# Patient Record
Sex: MALE | Race: WHITE | NOT HISPANIC OR LATINO | Employment: STUDENT | URBAN - METROPOLITAN AREA
[De-identification: names, ages, dates, MRNs, and addresses within clinical notes are randomized per-mention and may not be internally consistent; named-entity substitution may affect disease eponyms.]

---
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
## 2020-01-10 ENCOUNTER — OFFICE VISIT (OUTPATIENT)
Dept: GASTROENTEROLOGY | Facility: CLINIC | Age: 20
End: 2020-01-10
Payer: COMMERCIAL

## 2020-01-10 VITALS
TEMPERATURE: 97.4 F | HEIGHT: 74 IN | DIASTOLIC BLOOD PRESSURE: 60 MMHG | BODY MASS INDEX: 26.95 KG/M2 | WEIGHT: 210 LBS | SYSTOLIC BLOOD PRESSURE: 118 MMHG | HEART RATE: 65 BPM

## 2020-01-10 DIAGNOSIS — R14.0 BLOATING: Primary | ICD-10-CM

## 2020-01-10 PROCEDURE — 99214 OFFICE O/P EST MOD 30 MIN: CPT | Performed by: INTERNAL MEDICINE

## 2020-01-10 RX ORDER — SIMETHICONE 125 MG
125 TABLET,CHEWABLE ORAL EVERY 6 HOURS PRN
Qty: 30 TABLET | Refills: 0 | Status: SHIPPED | OUTPATIENT
Start: 2020-01-10 | End: 2020-03-19

## 2020-01-10 RX ORDER — SODIUM, POTASSIUM,MAG SULFATES 17.5-3.13G
180 SOLUTION, RECONSTITUTED, ORAL ORAL ONCE
Qty: 180 ML | Refills: 0 | Status: SHIPPED | OUTPATIENT
Start: 2020-01-10 | End: 2020-03-17 | Stop reason: ALTCHOICE

## 2020-01-10 NOTE — PROGRESS NOTES
[facility] - Outpatient Follow-up Note  Neetu Strickland 23 y o  male MRN: [id number]  Encounter: [id number]          ASSESSMENT AND PLAN:      1  Bloating  Will order celiac panel to rule out celiac disease as an etiology of his bloating/abdominal discomfort  - Celiac Disease Diagnostic Panel; Future  - simethicone (MYLICON) 902 MG chewable tablet; Chew 1 tablet (125 mg total) every 6 (six) hours as needed for flatulence  Dispense: 30 tablet; Refill: 0    2  Rectal bleeding  Still suspected to be hemorrhoidal in nature but given the ongoing nature likely needs endoscopic evaluation   - Colonoscopy; Future  - SUPREP BOWEL PREP KIT 17 5-3 13-1 6 GM/177ML SOLN; Take 180 mL by mouth once for 1 dose  Dispense: 180 mL; Refill: 0  ______________________________________________________________________    SUBJECTIVE:  Continues to have rectal bleeding  Did not resolve completely, still intermittent  Also now having abdominal bloating associated with meals  He is unsure if it really is related to gluten in the diet  REVIEW OF SYSTEMS IS OTHERWISE NEGATIVE        Historical Information   Past Medical History:   Diagnosis Date    Acute sinusitis 12/14/2015    Croup 12/26/2017    Rectal bleeding 4/22/2019     Past Surgical History:   Procedure Laterality Date    CIRCUMCISION      HERNIA REPAIR       Social History   Social History     Substance and Sexual Activity   Alcohol Use Never    Frequency: Never     Social History     Substance and Sexual Activity   Drug Use Never     Social History     Tobacco Use   Smoking Status Never Smoker   Smokeless Tobacco Never Used     Family History   Problem Relation Age of Onset    No Known Problems Mother     Other Father         GI Polyps       Meds/Allergies       Current Outpatient Medications:     albuterol (PROAIR HFA) 90 mcg/act inhaler    Multiple Vitamin (MULTIVITAMIN) tablet    Probiotic Product (PROBIOTIC PO)    calcium carbonate-vitamin D (OSCAL-D) 500 mg-200 units per tablet    fluticasone (FLOVENT HFA) 110 MCG/ACT inhaler    Omega-3 Fatty Acids (SM FISH OIL PO)    PAZEO 0 7 % SOLN    Allergies   Allergen Reactions    Amoxicillin Rash    Amoxicillin-Pot Clavulanate Rash           Objective     Blood pressure 118/60, pulse 65, temperature (!) 97 4 °F (36 3 °C), temperature source Tympanic, height 6' 2" (1 88 m), weight 95 3 kg (210 lb)  Body mass index is 26 96 kg/m²  PHYSICAL EXAM:      General Appearance:   Alert, cooperative, no distress   HEENT:   Normocephalic, atraumatic, anicteric  Lungs:   Clear to auscultation bilaterally; no rales, rhonchi or wheezing; respirations unlabored    Heart[de-identified]   Regular rate and rhythm; no murmur, rub, or gallop  Abdomen:   Soft, non-tender, non-distended; normal bowel sounds; no masses, no organomegaly    Genitalia:   Deferred    Rectal:   Deferred    Extremities:  No cyanosis, clubbing or edema    Pulses:  2+ and symmetric    Skin:  No jaundice, rashes, or lesions          Lab Results:   No visits with results within 1 Day(s) from this visit  Latest known visit with results is:   Orders Only on 09/22/2019   Component Date Value    Salmonella/Shigella Scre* 09/22/2019 Final report     Campylobacter Culture 09/22/2019 Final report     E Coli Shiga Toxin EIA 09/22/2019 Negative     Result 1 09/22/2019 Comment     Result 1 09/22/2019 Comment     Ova + Parasite Exam 09/22/2019 Final report     Result 1 09/22/2019 Comment          Radiology Results:   No results found

## 2020-01-10 NOTE — PATIENT INSTRUCTIONS
Colonoscopy scheduled with Dr Melgar at [facility] on 3/17/20  Kriss Fuentes gave patient verbal instructions/paper work given    [employer]

## 2020-01-11 LAB
ENDOMYSIUM IGA SER QL: NEGATIVE
IGA SERPL-MCNC: 121 MG/DL (ref 90–386)
TTG IGA SER-ACNC: <2 U/ML (ref 0–3)

## 2020-01-13 ENCOUNTER — TELEPHONE (OUTPATIENT)
Dept: GASTROENTEROLOGY | Facility: MEDICAL CENTER | Age: 20
End: 2020-01-13

## 2020-01-13 NOTE — TELEPHONE ENCOUNTER
----- Message from Sarita Holly PA-C sent at 1/13/2020  7:57 AM EST -----  Please let patient know blood work for celiac disease is negative, good news

## 2020-01-17 ENCOUNTER — TRANSCRIBE ORDERS (OUTPATIENT)
Dept: GASTROENTEROLOGY | Facility: CLINIC | Age: 20
End: 2020-01-17

## 2020-02-27 ENCOUNTER — TELEPHONE (OUTPATIENT)
Dept: GASTROENTEROLOGY | Facility: CLINIC | Age: 20
End: 2020-02-27

## 2020-02-27 NOTE — TELEPHONE ENCOUNTER
[address] [place] [address] and spoke to [address] [place] [address] for prior authorization for a colonoscopy  No auth required    Call ref # [id number]

## 2020-03-03 ENCOUNTER — ANESTHESIA EVENT (OUTPATIENT)
Dept: GASTROENTEROLOGY | Facility: AMBULARY SURGERY CENTER | Age: 20
End: 2020-03-03

## 2020-03-17 ENCOUNTER — HOSPITAL ENCOUNTER (OUTPATIENT)
Dept: GASTROENTEROLOGY | Facility: AMBULARY SURGERY CENTER | Age: 20
Setting detail: OUTPATIENT SURGERY
Discharge: HOME | End: 2020-03-17
Attending: INTERNAL MEDICINE | Admitting: INTERNAL MEDICINE
Payer: COMMERCIAL

## 2020-03-17 ENCOUNTER — ANESTHESIA (OUTPATIENT)
Dept: GASTROENTEROLOGY | Facility: AMBULARY SURGERY CENTER | Age: 20
End: 2020-03-17

## 2020-03-17 VITALS
WEIGHT: 196 LBS | DIASTOLIC BLOOD PRESSURE: 52 MMHG | HEIGHT: 74 IN | RESPIRATION RATE: 16 BRPM | BODY MASS INDEX: 25.15 KG/M2 | TEMPERATURE: 97.6 F | HEART RATE: 51 BPM | SYSTOLIC BLOOD PRESSURE: 98 MMHG | OXYGEN SATURATION: 96 %

## 2020-03-17 DIAGNOSIS — R14.0 BLOATING: ICD-10-CM

## 2020-03-17 PROCEDURE — 88305 TISSUE EXAM BY PATHOLOGIST: CPT | Performed by: PATHOLOGY

## 2020-03-17 PROCEDURE — 88313 SPECIAL STAINS GROUP 2: CPT | Performed by: PATHOLOGY

## 2020-03-17 PROCEDURE — 45380 COLONOSCOPY AND BIOPSY: CPT | Performed by: INTERNAL MEDICINE

## 2020-03-17 RX ORDER — PROPOFOL 10 MG/ML
INJECTION, EMULSION INTRAVENOUS AS NEEDED
Status: DISCONTINUED | OUTPATIENT
Start: 2020-03-17 | End: 2020-03-17 | Stop reason: SURG

## 2020-03-17 RX ORDER — SODIUM CHLORIDE, SODIUM LACTATE, POTASSIUM CHLORIDE, CALCIUM CHLORIDE 600; 310; 30; 20 MG/100ML; MG/100ML; MG/100ML; MG/100ML
125 INJECTION, SOLUTION INTRAVENOUS CONTINUOUS
Status: DISCONTINUED | OUTPATIENT
Start: 2020-03-17 | End: 2020-03-21 | Stop reason: HOSPADM

## 2020-03-17 RX ORDER — PROPOFOL 10 MG/ML
INJECTION, EMULSION INTRAVENOUS CONTINUOUS PRN
Status: DISCONTINUED | OUTPATIENT
Start: 2020-03-17 | End: 2020-03-17 | Stop reason: SURG

## 2020-03-17 RX ADMIN — PROPOFOL 100 MG: 10 INJECTION, EMULSION INTRAVENOUS at 10:27

## 2020-03-17 RX ADMIN — PROPOFOL 50 MG: 10 INJECTION, EMULSION INTRAVENOUS at 10:30

## 2020-03-17 RX ADMIN — SODIUM CHLORIDE, SODIUM LACTATE, POTASSIUM CHLORIDE, AND CALCIUM CHLORIDE: .6; .31; .03; .02 INJECTION, SOLUTION INTRAVENOUS at 10:23

## 2020-03-17 RX ADMIN — SODIUM CHLORIDE, SODIUM LACTATE, POTASSIUM CHLORIDE, AND CALCIUM CHLORIDE 125 ML/HR: .6; .31; .03; .02 INJECTION, SOLUTION INTRAVENOUS at 09:53

## 2020-03-17 RX ADMIN — PROPOFOL 140 MCG/KG/MIN: 10 INJECTION, EMULSION INTRAVENOUS at 10:30

## 2020-03-17 RX ADMIN — PROPOFOL 100 MG: 10 INJECTION, EMULSION INTRAVENOUS at 10:28

## 2020-03-17 NOTE — H&P
History and Physical -  [facility]  Neetu Ignacio Strickland 23 y o  male MRN: [id number]                  HPI: Arty Later is a 23y o  year old male who presents for colonoscopy      REVIEW OF SYSTEMS: Per the HPI, and otherwise unremarkable  Historical Information   Past Medical History:   Diagnosis Date    Acute sinusitis 12/14/2015    Asthma     Croup 12/26/2017    Rectal bleeding 4/22/2019     Past Surgical History:   Procedure Laterality Date    CIRCUMCISION      HERNIA REPAIR       Social History   Social History     Substance and Sexual Activity   Alcohol Use Never    Frequency: Never     Social History     Substance and Sexual Activity   Drug Use Never     Social History     Tobacco Use   Smoking Status Never Smoker   Smokeless Tobacco Never Used     Family History   Problem Relation Age of Onset    No Known Problems Mother     Other Father         GI Polyps       Meds/Allergies       (Not in a hospital admission)    Allergies   Allergen Reactions    Amoxicillin Rash    Amoxicillin-Pot Clavulanate Rash       Objective     /61   Pulse 57   Temp 98 6 °F (37 °C) (Temporal)   Resp 18   Ht 6' 2" (1 88 m)   Wt 88 9 kg (196 lb)   SpO2 100%   BMI 25 16 kg/m²       PHYSICAL EXAM    Gen: NAD  CV: RRR  CHEST: Clear  ABD: soft, NT/ND  EXT: no edema      ASSESSMENT/PLAN:  This is a 23y o  year old male here for colonscopy, and he is stable and optimized for his procedure

## 2020-03-17 NOTE — ANESTHESIA POSTPROCEDURE EVALUATION
Post-Op Assessment Note    CV Status:  Stable    Pain management: adequate     Mental Status:  Sleepy   Hydration Status:  Euvolemic   PONV Controlled:  Controlled   Airway Patency:  Patent   Post Op Vitals Reviewed: Yes      Staff: CRNA, Anesthesiologist           BP (!) 95/45 (03/17/20 1057)    Temp 97 6 °F (36 4 °C) (03/17/20 1057)    Pulse 67 (03/17/20 1057)   Resp 16 (03/17/20 1057)    SpO2 97 % (03/17/20 1057)

## 2020-03-17 NOTE — ANESTHESIA PREPROCEDURE EVALUATION
Review of Systems/Medical History          Cardiovascular   Pulmonary  Not a smoker , Asthma , well controlled/ stable Last rescue: < 6 months ago ,   Comment: Croup     GI/Hepatic            Endo/Other     GYN       Hematology   Musculoskeletal       Neurology   Psychology           Physical Exam    Airway    Mallampati score: I  TM Distance: >3 FB  Neck ROM: full     Dental   No notable dental hx     Cardiovascular      Pulmonary      Other Findings        Anesthesia Plan  ASA Score- 2     Anesthesia Type- IV sedation with anesthesia with ASA Monitors  Additional Monitors:   Airway Plan:         Plan Factors-Patient not instructed to abstain from smoking on day of procedure  Patient did not smoke on day of surgery  Induction- intravenous  Postoperative Plan-     Informed Consent- Anesthetic plan and risks discussed with patient  I personally reviewed this patient with the CRNA  Discussed and agreed on the Anesthesia Plan with the CRNA  Josefina Washington

## 2020-03-19 ENCOUNTER — OFFICE VISIT (OUTPATIENT)
Dept: PEDIATRICS CLINIC | Age: 20
End: 2020-03-19
Payer: COMMERCIAL

## 2020-03-19 VITALS
TEMPERATURE: 98.4 F | SYSTOLIC BLOOD PRESSURE: 114 MMHG | RESPIRATION RATE: 18 BRPM | HEART RATE: 78 BPM | DIASTOLIC BLOOD PRESSURE: 74 MMHG | BODY MASS INDEX: 26.64 KG/M2 | HEIGHT: 73 IN | WEIGHT: 201 LBS

## 2020-03-19 DIAGNOSIS — Z13.31 NEGATIVE DEPRESSION SCREENING: ICD-10-CM

## 2020-03-19 DIAGNOSIS — Z23 NEED FOR DIPHTHERIA-TETANUS-PERTUSSIS (TDAP) VACCINE: ICD-10-CM

## 2020-03-19 DIAGNOSIS — Z00.00 WELL ADULT EXAM: Primary | ICD-10-CM

## 2020-03-19 PROCEDURE — 99173 VISUAL ACUITY SCREEN: CPT | Performed by: PEDIATRICS

## 2020-03-19 PROCEDURE — 99395 PREV VISIT EST AGE 18-39: CPT | Performed by: PEDIATRICS

## 2020-03-19 PROCEDURE — 90715 TDAP VACCINE 7 YRS/> IM: CPT

## 2020-03-19 PROCEDURE — 90471 IMMUNIZATION ADMIN: CPT

## 2020-03-19 NOTE — PROGRESS NOTES
Subjective:     Maykel Gomez is a 23 y o  male who is brought in for this well child visit  History provided by: patient and mother    Current Issues:  Current concerns: none  Well Child Assessment:  Esequiel Slaughter lives with his mother and father  Interval problems include recent illness (small amount of blood in the stool found to have hemorroids) and recent injury (left ankle sprain March 6 2020 is doing better)  Nutrition  Types of intake include vegetables, fruits, meats, eggs, fish, cow's milk and junk food  Junk food includes fast food and desserts  Dental  The patient has a dental home  The patient brushes teeth regularly  The patient flosses regularly  Last dental exam was less than 6 months ago  Elimination  Elimination problems do not include constipation, diarrhea or urinary symptoms  There is no bed wetting  Sleep  Average sleep duration (hrs): 7-8  The patient does not snore  There are no sleep problems  Safety  There is no smoking in the home  Home has working smoke alarms? yes  Home has working carbon monoxide alarms? yes  There is a gun in home (locked away)  School  Grade level in school: College  There are no signs of learning disabilities  Child is doing well in school  Social  Screen time per day: Over 2 hours a day  The following portions of the patient's history were reviewed and updated as appropriate:   He  has a past medical history of Acute sinusitis (12/14/2015), Asthma, Croup (12/26/2017), and Rectal bleeding (4/22/2019)  He   There are no active problems to display for this patient  He  has a past surgical history that includes Circumcision and Hernia repair  His family history includes No Known Problems in his mother; Other in his father  He  reports that he has never smoked  He has never used smokeless tobacco  He reports that he does not drink alcohol or use drugs    Current Outpatient Medications   Medication Sig Dispense Refill    albuterol (PROAIR HFA) 90 mcg/act inhaler Inhale 1-2 puffs      fluticasone (FLOVENT HFA) 110 MCG/ACT inhaler Inhale 2 puffs 2 (two) times a day Rinse mouth after use   PAZEO 0 7 % SOLN        No current facility-administered medications for this visit  Facility-Administered Medications Ordered in Other Visits   Medication Dose Route Frequency Provider Last Rate Last Dose    lactated ringers infusion  125 mL/hr Intravenous Continuous Lenny Michael MD   Stopped at 03/17/20 1050     Current Outpatient Medications on File Prior to Visit   Medication Sig    albuterol (PROAIR HFA) 90 mcg/act inhaler Inhale 1-2 puffs    fluticasone (FLOVENT HFA) 110 MCG/ACT inhaler Inhale 2 puffs 2 (two) times a day Rinse mouth after use   PAZEO 0 7 % SOLN     [DISCONTINUED] calcium carbonate-vitamin D (OSCAL-D) 500 mg-200 units per tablet Take 1 tablet by mouth 2 (two) times a day with meals    [DISCONTINUED] Multiple Vitamin (MULTIVITAMIN) tablet Take 1 tablet by mouth daily    [DISCONTINUED] Omega-3 Fatty Acids (SM FISH OIL PO) Take by mouth    [DISCONTINUED] Probiotic Product (PROBIOTIC PO) Take by mouth    [DISCONTINUED] simethicone (MYLICON) 440 MG chewable tablet Chew 1 tablet (125 mg total) every 6 (six) hours as needed for flatulence (Patient not taking: Reported on 3/19/2020)     Current Facility-Administered Medications on File Prior to Visit   Medication    lactated ringers infusion     He is allergic to amoxicillin and amoxicillin-pot clavulanate         Review of Systems   Constitutional: Negative for fever  HENT: Negative for congestion and rhinorrhea  Eyes: Negative for discharge, redness and itching  Respiratory: Negative for snoring, cough and shortness of breath  Gastrointestinal: Negative for constipation, diarrhea and vomiting  Genitourinary: Negative for decreased urine volume and difficulty urinating  Skin: Negative for rash  Neurological: Negative for headaches     Psychiatric/Behavioral: Negative for sleep disturbance  Objective:       Vitals:    03/19/20 0957   BP: 114/74   BP Location: Left arm   Patient Position: Sitting   Cuff Size: Standard   Pulse: 78   Resp: 18   Temp: 98 4 °F (36 9 °C)   TempSrc: Temporal   Weight: 91 2 kg (201 lb)   Height: 6' 1" (1 854 m)     Growth parameters are noted and are appropriate for age  Wt Readings from Last 1 Encounters:   03/19/20 91 2 kg (201 lb) (93 %, Z= 1 47)*     * Growth percentiles are based on CDC (Boys, 2-20 Years) data  Ht Readings from Last 1 Encounters:   03/19/20 6' 1" (1 854 m) (89 %, Z= 1 22)*     * Growth percentiles are based on [facility] [place] (Boys, 2-20 Years) data  Body mass index is 26 52 kg/m²  Vitals:    03/19/20 0957   BP: 114/74   BP Location: Left arm   Patient Position: Sitting   Cuff Size: Standard   Pulse: 78   Resp: 18   Temp: 98 4 °F (36 9 °C)   TempSrc: Temporal   Weight: 91 2 kg (201 lb)   Height: 6' 1" (1 854 m)        Hearing Screening    Method: Otoacoustic emissions    125Hz 250Hz 500Hz 1000Hz 2000Hz 3000Hz 4000Hz 6000Hz 8000Hz   Right ear:     15 15 15     Left ear:     15 15 15     Comments: Bilateral pass  Right ear 5000 HZ - 15 DB   Left ear 5000 HZ - 15 DB      Visual Acuity Screening    Right eye Left eye Both eyes   Without correction:      With correction: 20/20 20/20 20/20   Comments: With contacts       Physical Exam   Constitutional: He is oriented to person, place, and time  He appears well-developed and well-nourished  No distress  HENT:   Head: Normocephalic and atraumatic  Right Ear: Tympanic membrane and external ear normal    Left Ear: Tympanic membrane and external ear normal    Nose: Nose normal    Mouth/Throat: Oropharynx is clear and moist  No oropharyngeal exudate  Eyes: Pupils are equal, round, and reactive to light  Conjunctivae and EOM are normal  Right eye exhibits no discharge  Left eye exhibits no discharge  Fundi clear   Neck: Normal range of motion  Neck supple  No thyromegaly present  Cardiovascular: Normal rate, regular rhythm and normal heart sounds  No murmur heard  Pulmonary/Chest: Effort normal and breath sounds normal  No respiratory distress  He has no wheezes  He has no rales  Abdominal: Soft  Bowel sounds are normal  He exhibits no distension and no mass  There is no tenderness  There is no guarding  Genitourinary:   Genitourinary Comments: Rodirgo 5   Musculoskeletal: Normal range of motion  He exhibits tenderness (left lateral malleous )  No scoliosis   Lymphadenopathy:     He has no cervical adenopathy  Neurological: He is alert and oriented to person, place, and time  He has normal reflexes  He displays normal reflexes  No cranial nerve deficit  He exhibits normal muscle tone  Skin: Skin is dry  Psychiatric: He has a normal mood and affect  His behavior is normal  Judgment and thought content normal    Nursing note and vitals reviewed  Assessment:     Well adolescent  1  Well adult exam     2  Need for diphtheria-tetanus-pertussis (Tdap) vaccine  TDAP VACCINE GREATER THAN OR EQUAL TO 8YO IM   3  BMI 25 0-25 9,adult     4  Negative depression screening          Plan:         1  Anticipatory guidance discussed  Specific topics reviewed: drugs, ETOH, and tobacco, limit TV, media violence, seat belts, sex; STD and pregnancy prevention and testicular self-exam     BMI Counseling: Body mass index is 26 52 kg/m²  The BMI is above normal  Nutrition recommendations include consuming healthier snacks, moderation in carbohydrate intake and reducing intake of saturated and trans fat  Exercise recommendations include exercising 3-5 times per week  No pharmacotherapy was ordered  2  Development: appropriate for age    1  Immunizations today: per orders  Vaccine Counseling: Discussed with: Ped parent/guardian: mother and patient    The benefits, contraindication and side effects for the following vaccines were reviewed: Immunization component list: Tetanus, Diphtheria and pertussis  Total number of components reveiwed:3    4  Follow-up visit in 1 year for next well child visit, or sooner as needed

## 2020-04-02 ENCOUNTER — TELEPHONE (OUTPATIENT)
Dept: GASTROENTEROLOGY | Facility: CLINIC | Age: 20
End: 2020-04-02

## 2021-01-25 ENCOUNTER — TELEPHONE (OUTPATIENT)
Dept: PEDIATRICS CLINIC | Age: 21
End: 2021-01-25

## 2021-01-25 DIAGNOSIS — Z20.822 CLOSE EXPOSURE TO COVID-19 VIRUS: Primary | ICD-10-CM

## 2021-01-25 DIAGNOSIS — Z20.822 CLOSE EXPOSURE TO COVID-19 VIRUS: ICD-10-CM

## 2021-01-25 PROCEDURE — U0005 INFEC AGEN DETEC AMPLI PROBE: HCPCS | Performed by: PEDIATRICS

## 2021-01-25 PROCEDURE — U0003 INFECTIOUS AGENT DETECTION BY NUCLEIC ACID (DNA OR RNA); SEVERE ACUTE RESPIRATORY SYNDROME CORONAVIRUS 2 (SARS-COV-2) (CORONAVIRUS DISEASE [COVID-19]), AMPLIFIED PROBE TECHNIQUE, MAKING USE OF HIGH THROUGHPUT TECHNOLOGIES AS DESCRIBED BY CMS-2020-01-R: HCPCS | Performed by: PEDIATRICS

## 2021-01-27 LAB — SARS-COV-2 RNA RESP QL NAA+PROBE: NEGATIVE

## 2021-03-10 DIAGNOSIS — Z23 ENCOUNTER FOR IMMUNIZATION: ICD-10-CM

## 2021-06-01 ENCOUNTER — OFFICE VISIT (OUTPATIENT)
Dept: PEDIATRICS CLINIC | Age: 21
End: 2021-06-01
Payer: COMMERCIAL

## 2021-06-01 VITALS
HEART RATE: 72 BPM | WEIGHT: 199 LBS | RESPIRATION RATE: 12 BRPM | TEMPERATURE: 98.6 F | SYSTOLIC BLOOD PRESSURE: 126 MMHG | DIASTOLIC BLOOD PRESSURE: 70 MMHG | BODY MASS INDEX: 26.37 KG/M2 | HEIGHT: 73 IN

## 2021-06-01 DIAGNOSIS — F41.9 ANXIETY: ICD-10-CM

## 2021-06-01 DIAGNOSIS — Z00.00 WELL ADULT EXAM: Primary | ICD-10-CM

## 2021-06-01 DIAGNOSIS — Z13.31 NEGATIVE DEPRESSION SCREENING: ICD-10-CM

## 2021-06-01 PROCEDURE — 99173 VISUAL ACUITY SCREEN: CPT | Performed by: PEDIATRICS

## 2021-06-01 PROCEDURE — 99395 PREV VISIT EST AGE 18-39: CPT | Performed by: PEDIATRICS

## 2021-06-01 NOTE — PROGRESS NOTES
Subjective:     Chantale Peñaloza is a 21 y o  male who is brought in for this well child visit  History provided by: patient    Current Issues:  Current concerns: Anxiety in social situations  Would like to talk to a therapist      Well Child Assessment:  Giselle Stone lives with his mother and father  Interval problems do not include recent illness or recent injury  Nutrition  Types of intake include vegetables, fruits, meats, eggs, cow's milk, fish and junk food  Junk food includes desserts (limited)  Dental  The patient has a dental home  The patient brushes teeth regularly  The patient flosses regularly  Last dental exam was less than 6 months ago  Elimination  Elimination problems do not include constipation, diarrhea or urinary symptoms  There is no bed wetting  Behavioral  Behavioral issues do not include performing poorly at school  Sleep  Average sleep duration (hrs): 7-8  The patient does not snore  There are no sleep problems  Safety  There is no smoking in the home  Home has working smoke alarms? yes  Home has working carbon monoxide alarms? yes  There is a gun in home (locked away)  School  Grade level in school: College  There are signs of learning disabilities  Child is doing well in school  Social  The caregiver enjoys the child  Screen time per day: Over 2 hours a day  The following portions of the patient's history were reviewed and updated as appropriate:   He  has a past medical history of Acute sinusitis (12/14/2015), Asthma, Croup (12/26/2017), and Rectal bleeding (4/22/2019)  He   Patient Active Problem List    Diagnosis Date Noted    Well adult exam 06/01/2021    Anxiety 06/01/2021    Negative depression screening 06/01/2021    BMI 26 0-26 9,adult 06/01/2021     He  has a past surgical history that includes Circumcision and Hernia repair  His family history includes No Known Problems in his mother; Other in his father  He  reports that he has never smoked   He has never used smokeless tobacco  He reports that he does not drink alcohol or use drugs  Current Outpatient Medications   Medication Sig Dispense Refill    albuterol (PROAIR HFA) 90 mcg/act inhaler Inhale 1-2 puffs      fluticasone (FLOVENT HFA) 110 MCG/ACT inhaler Inhale 2 puffs 2 (two) times a day Rinse mouth after use   PAZEO 0 7 % SOLN        No current facility-administered medications for this visit  Current Outpatient Medications on File Prior to Visit   Medication Sig    albuterol (PROAIR HFA) 90 mcg/act inhaler Inhale 1-2 puffs    fluticasone (FLOVENT HFA) 110 MCG/ACT inhaler Inhale 2 puffs 2 (two) times a day Rinse mouth after use   PAZEO 0 7 % SOLN      No current facility-administered medications on file prior to visit  He is allergic to amoxicillin and amoxicillin-pot clavulanate         Review of Systems   Constitutional: Negative for fever  HENT: Negative for congestion and rhinorrhea  Eyes: Negative for discharge, redness and itching  Respiratory: Negative for snoring, cough and shortness of breath  Gastrointestinal: Negative for constipation, diarrhea and vomiting  Genitourinary: Negative for decreased urine volume and difficulty urinating  Skin: Negative for rash  Neurological: Negative for headaches  Psychiatric/Behavioral: Negative for sleep disturbance  The patient is nervous/anxious  Objective:       Vitals:    06/01/21 1259   BP: 126/70   Pulse: 72   Resp: 12   Temp: 98 6 °F (37 °C)   Weight: 90 3 kg (199 lb)   Height: 6' 1" (1 854 m)     Growth parameters are noted and are appropriate for age  Wt Readings from Last 1 Encounters:   06/01/21 90 3 kg (199 lb)     Ht Readings from Last 1 Encounters:   06/01/21 6' 1" (1 854 m)      Body mass index is 26 25 kg/m²      Vitals:    06/01/21 1259   BP: 126/70   Pulse: 72   Resp: 12   Temp: 98 6 °F (37 °C)   Weight: 90 3 kg (199 lb)   Height: 6' 1" (1 854 m)        Hearing Screening    Method: Otoacoustic emissions    125Hz 250Hz 500Hz 1000Hz 2000Hz 3000Hz 4000Hz 6000Hz 8000Hz   Right ear:     15 15 15     Left ear:     15 15 15     Comments: Pass bilat  R 5000hz 15db  L 5000hz 15db     Visual Acuity Screening    Right eye Left eye Both eyes   Without correction:      With correction: 20/20 20/20 20/20   Comments: contacts      Physical Exam  Vitals signs and nursing note reviewed  Constitutional:       General: He is not in acute distress  Appearance: Normal appearance  He is well-developed  He is not ill-appearing or toxic-appearing  HENT:      Head: Normocephalic and atraumatic  Right Ear: Tympanic membrane and external ear normal       Left Ear: Tympanic membrane and external ear normal       Nose: Nose normal  No congestion or rhinorrhea  Mouth/Throat:      Mouth: Mucous membranes are moist       Pharynx: Oropharynx is clear  No oropharyngeal exudate or posterior oropharyngeal erythema  Eyes:      General:         Right eye: No discharge  Left eye: No discharge  Extraocular Movements: Extraocular movements intact  Conjunctiva/sclera: Conjunctivae normal       Pupils: Pupils are equal, round, and reactive to light  Comments: Fundi clear   Neck:      Musculoskeletal: Normal range of motion and neck supple  Thyroid: No thyromegaly  Cardiovascular:      Rate and Rhythm: Normal rate and regular rhythm  Pulses: Normal pulses  Heart sounds: Normal heart sounds  No murmur  Pulmonary:      Effort: Pulmonary effort is normal  No respiratory distress  Breath sounds: Normal breath sounds  No wheezing or rales  Abdominal:      General: Bowel sounds are normal  There is no distension  Palpations: Abdomen is soft  There is no mass  Tenderness: There is no abdominal tenderness  There is no right CVA tenderness, left CVA tenderness or guarding  Genitourinary:     Penis: Normal        Scrotum/Testes: Normal       Comments:  Rodrigo 5  Musculoskeletal: Normal range of motion  Comments: No vertebral asymmetry   Lymphadenopathy:      Cervical: No cervical adenopathy  Skin:     General: Skin is dry  Neurological:      Mental Status: He is alert and oriented to person, place, and time  Cranial Nerves: No cranial nerve deficit  Motor: No abnormal muscle tone  Deep Tendon Reflexes: Reflexes are normal and symmetric  Reflexes normal    Psychiatric:         Mood and Affect: Mood normal          Behavior: Behavior normal          Thought Content: Thought content normal          Judgment: Judgment normal            Assessment:     Well adolescent  1  Well adult exam  CBC and differential    Lipid panel    Comprehensive metabolic panel    CBC and differential    Lipid panel    Comprehensive metabolic panel   2  Anxiety  Ambulatory referral to Psychology   3  Negative depression screening     4  BMI 26 0-26 9,adult          Plan:         1  Anticipatory guidance discussed  Specific topics reviewed: bicycle helmets, drugs, ETOH, and tobacco, importance of regular exercise, importance of varied diet, limit TV, media violence, minimize junk food, seat belts, sex; STD and pregnancy prevention and testicular self-exam     BMI Counseling: Body mass index is 26 25 kg/m²  The BMI is above normal  Nutrition recommendations include encouraging healthy choices of fruits and vegetables, moderation in carbohydrate intake and increasing intake of lean protein  Exercise recommendations include moderate physical activity 150 minutes/week  No pharmacotherapy was ordered  2  Development: appropriate for age    1  Immunizations today: none      4  Follow-up visit in 1 year for next well child visit, or sooner as needed

## 2021-06-02 ENCOUNTER — TELEPHONE (OUTPATIENT)
Dept: PSYCHIATRY | Facility: CLINIC | Age: 21
End: 2021-06-02

## 2021-06-02 NOTE — TELEPHONE ENCOUNTER
Patient called and would like to set up an apt he has a referral on file can you please give him a call thank you

## 2021-06-04 ENCOUNTER — TELEPHONE (OUTPATIENT)
Dept: PSYCHIATRY | Facility: CLINIC | Age: 21
End: 2021-06-04

## 2021-06-04 NOTE — TELEPHONE ENCOUNTER
Patient is interested in setting up an apt for therapy can you please add him to the wait list thank you

## 2021-06-21 LAB
ALBUMIN SERPL-MCNC: 4.6 G/DL (ref 4.1–5.2)
ALBUMIN/GLOB SERPL: 2.2 {RATIO} (ref 1.2–2.2)
ALP SERPL-CCNC: 72 IU/L (ref 55–125)
ALT SERPL-CCNC: 19 IU/L (ref 0–44)
AST SERPL-CCNC: 20 IU/L (ref 0–40)
BASOPHILS # BLD AUTO: 0.1 X10E3/UL (ref 0–0.2)
BASOPHILS NFR BLD AUTO: 1 %
BILIRUB SERPL-MCNC: 0.7 MG/DL (ref 0–1.2)
BUN SERPL-MCNC: 23 MG/DL (ref 6–20)
BUN/CREAT SERPL: 20 (ref 9–20)
CALCIUM SERPL-MCNC: 9.9 MG/DL (ref 8.7–10.2)
CHLORIDE SERPL-SCNC: 101 MMOL/L (ref 96–106)
CHOLEST SERPL-MCNC: 137 MG/DL (ref 100–199)
CHOLEST/HDLC SERPL: 2.4 RATIO (ref 0–5)
CO2 SERPL-SCNC: 27 MMOL/L (ref 20–29)
CREAT SERPL-MCNC: 1.17 MG/DL (ref 0.76–1.27)
EOSINOPHIL # BLD AUTO: 0.1 X10E3/UL (ref 0–0.4)
EOSINOPHIL NFR BLD AUTO: 3 %
ERYTHROCYTE [DISTWIDTH] IN BLOOD BY AUTOMATED COUNT: 11.9 % (ref 11.6–15.4)
GLOBULIN SER-MCNC: 2.1 G/DL (ref 1.5–4.5)
GLUCOSE SERPL-MCNC: 87 MG/DL (ref 65–99)
HCT VFR BLD AUTO: 40.7 % (ref 37.5–51)
HDLC SERPL-MCNC: 57 MG/DL
HGB BLD-MCNC: 13.5 G/DL (ref 13–17.7)
IMM GRANULOCYTES # BLD: 0 X10E3/UL (ref 0–0.1)
IMM GRANULOCYTES NFR BLD: 0 %
LDLC SERPL CALC-MCNC: 69 MG/DL (ref 0–99)
LYMPHOCYTES # BLD AUTO: 1.8 X10E3/UL (ref 0.7–3.1)
LYMPHOCYTES NFR BLD AUTO: 38 %
MCH RBC QN AUTO: 30.3 PG (ref 26.6–33)
MCHC RBC AUTO-ENTMCNC: 33.2 G/DL (ref 31.5–35.7)
MCV RBC AUTO: 92 FL (ref 79–97)
MONOCYTES # BLD AUTO: 0.5 X10E3/UL (ref 0.1–0.9)
MONOCYTES NFR BLD AUTO: 10 %
NEUTROPHILS # BLD AUTO: 2.3 X10E3/UL (ref 1.4–7)
NEUTROPHILS NFR BLD AUTO: 48 %
PLATELET # BLD AUTO: 179 X10E3/UL (ref 150–450)
POTASSIUM SERPL-SCNC: 4.5 MMOL/L (ref 3.5–5.2)
PROT SERPL-MCNC: 6.7 G/DL (ref 6–8.5)
RBC # BLD AUTO: 4.45 X10E6/UL (ref 4.14–5.8)
SL AMB EGFR AFRICAN AMERICAN: 103 ML/MIN/1.73
SL AMB EGFR NON AFRICAN AMERICAN: 89 ML/MIN/1.73
SL AMB VLDL CHOLESTEROL CALC: 11 MG/DL (ref 5–40)
SODIUM SERPL-SCNC: 139 MMOL/L (ref 134–144)
TRIGL SERPL-MCNC: 48 MG/DL (ref 0–149)
WBC # BLD AUTO: 4.7 X10E3/UL (ref 3.4–10.8)

## 2022-01-03 ENCOUNTER — TELEPHONE (OUTPATIENT)
Dept: PEDIATRICS CLINIC | Age: 22
End: 2022-01-03

## 2022-01-04 ENCOUNTER — OFFICE VISIT (OUTPATIENT)
Dept: PEDIATRICS CLINIC | Age: 22
End: 2022-01-04
Payer: COMMERCIAL

## 2022-01-04 ENCOUNTER — HOSPITAL ENCOUNTER (OUTPATIENT)
Dept: RADIOLOGY | Facility: HOSPITAL | Age: 22
Discharge: HOME | End: 2022-01-04
Attending: PEDIATRICS
Payer: COMMERCIAL

## 2022-01-04 ENCOUNTER — OFFICE VISIT (OUTPATIENT)
Dept: LAB | Facility: HOSPITAL | Age: 22
End: 2022-01-04
Attending: PEDIATRICS
Payer: COMMERCIAL

## 2022-01-04 VITALS
SYSTOLIC BLOOD PRESSURE: 118 MMHG | DIASTOLIC BLOOD PRESSURE: 70 MMHG | BODY MASS INDEX: 26.65 KG/M2 | TEMPERATURE: 98.2 F | WEIGHT: 202 LBS

## 2022-01-04 DIAGNOSIS — R07.9 CHEST PAIN, UNSPECIFIED TYPE: ICD-10-CM

## 2022-01-04 DIAGNOSIS — R07.9 CHEST PAIN, UNSPECIFIED TYPE: Primary | ICD-10-CM

## 2022-01-04 LAB
ATRIAL RATE: 62 BPM
P AXIS: 67 DEGREES
PR INTERVAL: 160 MS
QRS AXIS: 21 DEGREES
QRSD INTERVAL: 102 MS
QT INTERVAL: 402 MS
QTC INTERVAL: 408 MS
T WAVE AXIS: 34 DEGREES
VENTRICULAR RATE: 62 BPM

## 2022-01-04 PROCEDURE — 99213 OFFICE O/P EST LOW 20 MIN: CPT | Performed by: PEDIATRICS

## 2022-01-04 PROCEDURE — 93005 ELECTROCARDIOGRAM TRACING: CPT

## 2022-01-04 PROCEDURE — 93010 ELECTROCARDIOGRAM REPORT: CPT | Performed by: INTERNAL MEDICINE

## 2022-01-04 PROCEDURE — 71046 X-RAY EXAM CHEST 2 VIEWS: CPT

## 2022-01-04 NOTE — PROGRESS NOTES
Assessment/Plan:  He is feeling much better this week  He did the same work out yesterday and had no chest pain  I will get a chest xray and ECG  He will follow up pending the results  Increase hydration with the workouts  Diagnoses and all orders for this visit:    Chest pain, unspecified type  -     ECG 12 lead; Future  -     XR chest pa & lateral; Future          Subjective:      Patient ID: Rj Larsen is a 24 y o  male  Chest Pain   This is a new problem  The current episode started 1 to 4 weeks ago  The onset quality is gradual  The problem occurs intermittently  The pain is present in the substernal region  The quality of the pain is described as heavy  The pain does not radiate  Associated symptoms include dizziness, exertional chest pressure, headaches, malaise/fatigue, near-syncope, palpitations and shortness of breath  Pertinent negatives include no abdominal pain, back pain, cough, diaphoresis, fever, leg pain, lower extremity edema, nausea, numbness, syncope, vomiting or weakness  The pain is aggravated by exertion  He has tried rest for the symptoms  The treatment provided significant relief  Risk factors include male gender  Pertinent negatives for family medical history include: no heart disease and no early MI  The following portions of the patient's history were reviewed and updated as appropriate:   He  has a past medical history of Acute sinusitis (12/14/2015), Asthma, Croup (12/26/2017), and Rectal bleeding (4/22/2019)  He   Patient Active Problem List    Diagnosis Date Noted    Well adult exam 06/01/2021    Anxiety 06/01/2021    Negative depression screening 06/01/2021    BMI 26 0-26 9,adult 06/01/2021     He  has a past surgical history that includes Circumcision and Hernia repair  His family history includes No Known Problems in his mother; Other in his father  He  reports that he has never smoked   He has never used smokeless tobacco  He reports that he does not drink alcohol and does not use drugs  Current Outpatient Medications   Medication Sig Dispense Refill    albuterol (PROAIR HFA) 90 mcg/act inhaler Inhale 1-2 puffs      fluticasone (FLOVENT HFA) 110 MCG/ACT inhaler Inhale 2 puffs 2 (two) times a day Rinse mouth after use   PAZEO 0 7 % SOLN        No current facility-administered medications for this visit  Current Outpatient Medications on File Prior to Visit   Medication Sig    albuterol (PROAIR HFA) 90 mcg/act inhaler Inhale 1-2 puffs    fluticasone (FLOVENT HFA) 110 MCG/ACT inhaler Inhale 2 puffs 2 (two) times a day Rinse mouth after use   PAZEO 0 7 % SOLN      No current facility-administered medications on file prior to visit  He is allergic to amoxicillin and amoxicillin-pot clavulanate       Review of Systems   Constitutional: Positive for malaise/fatigue  Negative for diaphoresis and fever  HENT: Negative for congestion and rhinorrhea  Respiratory: Positive for shortness of breath  Negative for cough and chest tightness  Cardiovascular: Positive for chest pain, palpitations and near-syncope  Negative for syncope  Gastrointestinal: Negative for abdominal pain, nausea and vomiting  Genitourinary: Negative for decreased urine volume  Musculoskeletal: Negative for back pain  Neurological: Positive for dizziness and headaches  Negative for weakness, light-headedness and numbness  Objective:      /70   Temp 98 2 °F (36 8 °C)   Wt 91 6 kg (202 lb)   BMI 26 65 kg/m²          Physical Exam  Constitutional:       General: He is not in acute distress  Appearance: He is well-developed and normal weight  He is not ill-appearing, toxic-appearing or diaphoretic  HENT:      Head: Normocephalic  Right Ear: External ear normal       Left Ear: External ear normal       Nose: Nose normal       Mouth/Throat:      Pharynx: No oropharyngeal exudate  Eyes:      General:         Right eye: No discharge  Left eye: No discharge  Conjunctiva/sclera: Conjunctivae normal       Pupils: Pupils are equal, round, and reactive to light  Neck:      Vascular: No JVD  Cardiovascular:      Rate and Rhythm: Normal rate and regular rhythm  Pulses:           Carotid pulses are 2+ on the right side and 2+ on the left side  Radial pulses are 2+ on the right side and 2+ on the left side  Heart sounds: Normal heart sounds  No murmur heard  Pulmonary:      Effort: Pulmonary effort is normal  No respiratory distress  Breath sounds: Normal breath sounds  No wheezing or rales  Chest:      Chest wall: No mass, deformity or tenderness  Abdominal:      General: Bowel sounds are normal  There is no distension  Palpations: Abdomen is soft  There is no mass  Tenderness: There is no abdominal tenderness  There is no guarding  Musculoskeletal:      Cervical back: Normal range of motion and neck supple  Right lower leg: No edema  Left lower leg: No edema  Lymphadenopathy:      Cervical: No cervical adenopathy  Skin:     General: Skin is warm  Neurological:      Mental Status: He is alert  Psychiatric:         Mood and Affect: Mood is not anxious  Behavior: Behavior normal  Behavior is not agitated

## 2022-01-06 NOTE — RESULT ENCOUNTER NOTE
Chest x-ray was normal   No treatment at this time    If the pain in the chest returns then [facility] would need to see a cardiologist

## 2022-05-18 ENCOUNTER — OFFICE VISIT (OUTPATIENT)
Dept: PEDIATRICS CLINIC | Age: 22
End: 2022-05-18
Payer: COMMERCIAL

## 2022-05-18 VITALS
DIASTOLIC BLOOD PRESSURE: 72 MMHG | SYSTOLIC BLOOD PRESSURE: 118 MMHG | HEART RATE: 68 BPM | BODY MASS INDEX: 25.05 KG/M2 | RESPIRATION RATE: 16 BRPM | WEIGHT: 189 LBS | HEIGHT: 73 IN | TEMPERATURE: 97.8 F

## 2022-05-18 DIAGNOSIS — Z71.82 EXERCISE COUNSELING: ICD-10-CM

## 2022-05-18 DIAGNOSIS — R19.7 BLOODY DIARRHEA: ICD-10-CM

## 2022-05-18 DIAGNOSIS — R07.9 CHEST PAIN, UNSPECIFIED TYPE: ICD-10-CM

## 2022-05-18 DIAGNOSIS — Z71.3 DIETARY COUNSELING: ICD-10-CM

## 2022-05-18 DIAGNOSIS — Z00.00 WELL ADULT EXAM: Primary | ICD-10-CM

## 2022-05-18 DIAGNOSIS — Z13.31 NEGATIVE DEPRESSION SCREENING: ICD-10-CM

## 2022-05-18 PROCEDURE — 99173 VISUAL ACUITY SCREEN: CPT | Performed by: PEDIATRICS

## 2022-05-18 PROCEDURE — 99395 PREV VISIT EST AGE 18-39: CPT | Performed by: PEDIATRICS

## 2022-05-18 NOTE — PROGRESS NOTES
Subjective:     Mark Liu is a 24 y o  male who is brought in for this well child visit  History provided by: patient    Current Issues:  Current concerns: blood and mucus in the stool x 2 weeks  Just returned from [facility] for a 2 month stay  In the past he felt lumps on the base of each testicle  No lumps now  Slight discomfort but no other symptoms  No sexually active  If the masses return I would get an ultrasound of the testicles  Well Child Assessment:  Adrian Conte lives with his mother and father  Interval problems include recent illness (Gastroenteritis last week and still has some diarrhea  )  Nutrition  Types of intake include vegetables, fruits, meats, eggs, fish, cow's milk and junk food  Junk food includes desserts (limited)  Dental  The patient has a dental home  The patient brushes teeth regularly  The patient flosses regularly  Last dental exam was less than 6 months ago  Elimination  Elimination problems include diarrhea (blood and mucus in the stool)  Elimination problems do not include constipation or urinary symptoms  Sleep  Average sleep duration (hrs): 7  There are no sleep problems  Safety  There is no smoking in the home  Home has working smoke alarms? yes  Home has working carbon monoxide alarms? yes  There is a gun in home (Locked away)  School  Grade level in school: College  There are no signs of learning disabilities  Child is doing well in school  Social  Screen time per day: Over 2 hours  The following portions of the patient's history were reviewed and updated as appropriate:   He  has a past medical history of Acute sinusitis (12/14/2015), Asthma, Croup (12/26/2017), and Rectal bleeding (4/22/2019)    He   Patient Active Problem List    Diagnosis Date Noted    Bloody diarrhea 05/18/2022    Dietary counseling 05/18/2022    Exercise counseling 05/18/2022    BMI 24 0-24 9, adult 05/18/2022    Chest pain 01/04/2022    Well adult exam 06/01/2021    Anxiety 06/01/2021    Negative depression screening 06/01/2021    BMI 26 0-26 9,adult 06/01/2021     He  has a past surgical history that includes Circumcision and Hernia repair  His family history includes No Known Problems in his mother; Other in his father  He  reports that he has never smoked  He has never used smokeless tobacco  He reports current alcohol use  He reports that he does not use drugs  Current Outpatient Medications   Medication Sig Dispense Refill    albuterol (PROAIR HFA) 90 mcg/act inhaler Inhale 1-2 puffs      fluticasone (FLOVENT HFA) 110 MCG/ACT inhaler Inhale 2 puffs 2 (two) times a day Rinse mouth after use   PAZEO 0 7 % SOLN        No current facility-administered medications for this visit  Current Outpatient Medications on File Prior to Visit   Medication Sig    albuterol (PROAIR HFA) 90 mcg/act inhaler Inhale 1-2 puffs    fluticasone (FLOVENT HFA) 110 MCG/ACT inhaler Inhale 2 puffs 2 (two) times a day Rinse mouth after use   PAZEO 0 7 % SOLN      No current facility-administered medications on file prior to visit  He is allergic to amoxicillin and amoxicillin-pot clavulanate         Review of Systems   Constitutional: Positive for appetite change  Negative for fever  HENT: Negative for congestion and rhinorrhea  Eyes: Negative for discharge, redness and itching  Respiratory: Negative for cough and shortness of breath  Cardiovascular: Positive for chest pain (history of intermittent pain)  Negative for palpitations  Gastrointestinal: Positive for abdominal pain, blood in stool and diarrhea (blood and mucus in the stool)  Negative for constipation and vomiting  Anal bleeding: mild  Genitourinary: Negative for decreased urine volume and difficulty urinating  Skin: Negative for rash  Neurological: Negative for headaches  Psychiatric/Behavioral: Negative for sleep disturbance          Objective:    PHQ-2/9 Depression Screening    Little interest or pleasure in doing things: 0 - not at all  Feeling down, depressed, or hopeless: 0 - not at all  Trouble falling or staying asleep, or sleeping too much: 0 - not at all  Feeling tired or having little energy: 0 - not at all  Poor appetite or overeatin - not at all  Feeling bad about yourself - or that you are a failure or have let yourself or your family down: 0 - not at all  Trouble concentrating on things, such as reading the newspaper or watching television: 0 - not at all  Moving or speaking so slowly that other people could have noticed  Or the opposite - being so fidgety or restless that you have been moving around a lot more than usual: 0 - not at all  Thoughts that you would be better off dead, or of hurting yourself in some way: 0 - not at all  PHQ-2 Score: 0  PHQ-2 Interpretation: Negative depression screen  PHQ-9 Score: 0   PHQ-9 Interpretation: No or Minimal depression             Vitals:    22 1013   BP: 118/72   Pulse: 68   Resp: 16   Temp: 97 8 °F (36 6 °C)   Weight: 85 7 kg (189 lb)   Height: 6' 1 25" (1 861 m)     Growth parameters are noted and are appropriate for age  Wt Readings from Last 1 Encounters:   22 85 7 kg (189 lb)     Ht Readings from Last 1 Encounters:   22 6' 1 25" (1 861 m)      Body mass index is 24 77 kg/m²  Vitals:    22 1013   BP: 118/72   Pulse: 68   Resp: 16   Temp: 97 8 °F (36 6 °C)   Weight: 85 7 kg (189 lb)   Height: 6' 1 25" (1 861 m)        Visual Acuity Screening    Right eye Left eye Both eyes   Without correction:      With correction: 20/25 20/30 20/20   Comments: Contacts     Hearing Screening Comments: broken    Physical Exam  Vitals and nursing note reviewed  Constitutional:       General: He is not in acute distress  Appearance: Normal appearance  He is well-developed  He is not ill-appearing  HENT:      Head: Normocephalic and atraumatic        Right Ear: Tympanic membrane and external ear normal       Left Ear: Tympanic membrane and external ear normal       Nose: Nose normal       Mouth/Throat:      Mouth: Mucous membranes are moist       Pharynx: Oropharynx is clear  No oropharyngeal exudate  Eyes:      General:         Right eye: No discharge  Left eye: No discharge  Extraocular Movements: Extraocular movements intact  Conjunctiva/sclera: Conjunctivae normal       Pupils: Pupils are equal, round, and reactive to light  Comments: Fundi clear   Neck:      Thyroid: No thyromegaly  Cardiovascular:      Rate and Rhythm: Normal rate and regular rhythm  Pulses: Normal pulses  Heart sounds: Normal heart sounds  No murmur heard  Pulmonary:      Effort: Pulmonary effort is normal  No respiratory distress  Breath sounds: Normal breath sounds  No wheezing or rales  Abdominal:      General: Bowel sounds are normal  There is no distension  Palpations: Abdomen is soft  There is no mass  Tenderness: There is no abdominal tenderness  There is no right CVA tenderness, left CVA tenderness or guarding  Genitourinary:     Penis: Normal        Testes: Normal       Comments: Rodrigo 5  Musculoskeletal:         General: Normal range of motion  Cervical back: Normal range of motion and neck supple  Comments: No vertebral asymmetry    Lymphadenopathy:      Cervical: No cervical adenopathy  Skin:     General: Skin is dry  Neurological:      Mental Status: He is alert and oriented to person, place, and time  Cranial Nerves: No cranial nerve deficit  Motor: No abnormal muscle tone  Deep Tendon Reflexes: Reflexes are normal and symmetric  Reflexes normal    Psychiatric:         Mood and Affect: Mood normal          Behavior: Behavior normal          Thought Content: Thought content normal          Judgment: Judgment normal            Assessment:     Well adolescent  1  Well adult exam     2  Bloody diarrhea     3  Negative depression screening     4  Dietary counseling     5  Exercise counseling     6  BMI 24 0-24 9, adult     7  Chest pain, unspecified type  Ambulatory Referral to Cardiology        Plan:     Stool PCR ordered  1  Anticipatory guidance discussed  Specific topics reviewed: drugs, ETOH, and tobacco, importance of regular exercise, importance of varied diet, limit TV, media violence, minimize junk food, seat belts, sex; STD and pregnancy prevention and testicular self-exam          2  Development: appropriate for age    1  Immunizations today: none    4  Follow-up visit in 1 year for next well child visit, or sooner as needed

## 2022-05-20 ENCOUNTER — TELEPHONE (OUTPATIENT)
Dept: PEDIATRICS CLINIC | Age: 22
End: 2022-05-20

## 2022-05-20 DIAGNOSIS — N50.89 TESTICULAR MASS: ICD-10-CM

## 2022-05-20 DIAGNOSIS — A04.5 INTESTINAL INFECTION DUE TO CAMPYLOBACTER JEJUNI: Primary | ICD-10-CM

## 2022-05-20 RX ORDER — AZITHROMYCIN 500 MG/1
500 TABLET, FILM COATED ORAL DAILY
Qty: 3 TABLET | Refills: 0 | Status: SHIPPED | OUTPATIENT
Start: 2022-05-20 | End: 2022-05-23

## 2022-05-23 ENCOUNTER — HOSPITAL ENCOUNTER (OUTPATIENT)
Dept: ULTRASOUND IMAGING | Facility: HOSPITAL | Age: 22
Discharge: HOME | End: 2022-05-23
Attending: PEDIATRICS
Payer: COMMERCIAL

## 2022-05-23 DIAGNOSIS — N50.89 TESTICULAR MASS: ICD-10-CM

## 2022-05-23 PROCEDURE — 76870 US EXAM SCROTUM: CPT

## 2022-05-24 ENCOUNTER — TELEPHONE (OUTPATIENT)
Dept: PEDIATRICS CLINIC | Age: 22
End: 2022-05-24

## 2022-05-24 NOTE — TELEPHONE ENCOUNTER
I spoke to Baylee and discussed the ultrasound results  I explained that if the masses keep reappearing in the scrotum that I want him to see urology  I think observation for now is reasonable  Baylee agreed at this point  He will call if he wants to see a urologist   As for the GI symptoms he is doing better since completing the Zithromax

## 2022-05-24 NOTE — TELEPHONE ENCOUNTER
Left message for a call back  Next plan of action is to see urology if the masses return in the scrotum

## 2022-06-03 ENCOUNTER — OFFICE VISIT (OUTPATIENT)
Dept: PEDIATRICS CLINIC | Age: 22
End: 2022-06-03
Payer: COMMERCIAL

## 2022-06-03 VITALS
DIASTOLIC BLOOD PRESSURE: 70 MMHG | SYSTOLIC BLOOD PRESSURE: 118 MMHG | TEMPERATURE: 98 F | BODY MASS INDEX: 25.55 KG/M2 | WEIGHT: 195 LBS

## 2022-06-03 DIAGNOSIS — R53.83 FATIGUE, UNSPECIFIED TYPE: Primary | ICD-10-CM

## 2022-06-03 PROBLEM — R19.7 BLOODY DIARRHEA: Status: RESOLVED | Noted: 2022-05-18 | Resolved: 2022-06-03

## 2022-06-03 PROCEDURE — 99213 OFFICE O/P EST LOW 20 MIN: CPT | Performed by: PEDIATRICS

## 2022-06-03 NOTE — PROGRESS NOTES
Assessment/Plan:I will order labs to address the fatigue  He is getting over a gastroenteritis which could have contributed to the fatigue  His stool/abdominal symptoms are improving  Follow up pending the labs  Diagnoses and all orders for this visit:    Fatigue, unspecified type  -     CBC and differential; Future  -     TSH + Free T4; Future  -     Comprehensive metabolic panel; Future  -     EBV acute panel; Future  -     Iron, TIBC and Ferritin Panel; Future  -     Sedimentation rate, automated; Future  -     C-reactive protein; Future  -     CBC and differential  -     TSH + Free T4  -     Comprehensive metabolic panel  -     EBV acute panel  -     Sedimentation rate, automated  -     C-reactive protein          Subjective:      Patient ID: Maire Seip is a 24 y o  male  Fatigue  This is a new problem  The current episode started more than 1 month ago (over 2 months)  Associated symptoms include fatigue and myalgias (right lower back)  Pertinent negatives include no anorexia, arthralgias, chills, congestion, diaphoresis, headaches, joint swelling, numbness, sore throat, urinary symptoms, vomiting or weakness  Associated symptoms comments: Loss of concentration, cranky, intermittent dizziness,   Nothing aggravates the symptoms  He has tried rest and sleep for the symptoms  The treatment provided no relief  The following portions of the patient's history were reviewed and updated as appropriate:   He  has a past medical history of Acute sinusitis (12/14/2015), Asthma, Croup (12/26/2017), and Rectal bleeding (4/22/2019)    He   Patient Active Problem List    Diagnosis Date Noted    Bloody diarrhea 05/18/2022    Dietary counseling 05/18/2022    Exercise counseling 05/18/2022    BMI 24 0-24 9, adult 05/18/2022    Chest pain 01/04/2022    Well adult exam 06/01/2021    Anxiety 06/01/2021    Negative depression screening 06/01/2021    BMI 26 0-26 9,adult 06/01/2021     He  has a past surgical history that includes Circumcision and Hernia repair  His family history includes No Known Problems in his mother; Other in his father  He  reports that he has never smoked  He has never used smokeless tobacco  He reports current alcohol use  He reports that he does not use drugs  Current Outpatient Medications   Medication Sig Dispense Refill    albuterol (PROAIR HFA) 90 mcg/act inhaler Inhale 1-2 puffs      fluticasone (FLOVENT HFA) 110 MCG/ACT inhaler Inhale 2 puffs 2 (two) times a day Rinse mouth after use   PAZEO 0 7 % SOLN        No current facility-administered medications for this visit  Current Outpatient Medications on File Prior to Visit   Medication Sig    albuterol (PROAIR HFA) 90 mcg/act inhaler Inhale 1-2 puffs    fluticasone (FLOVENT HFA) 110 MCG/ACT inhaler Inhale 2 puffs 2 (two) times a day Rinse mouth after use   PAZEO 0 7 % SOLN      No current facility-administered medications on file prior to visit  He is allergic to amoxicillin and amoxicillin-pot clavulanate       Review of Systems   Constitutional: Positive for fatigue  Negative for activity change, appetite change, chills and diaphoresis  HENT: Negative for congestion and sore throat  Gastrointestinal: Negative for anorexia, diarrhea and vomiting  Genitourinary: Negative for decreased urine volume  Musculoskeletal: Positive for myalgias (right lower back)  Negative for arthralgias and joint swelling  Neurological: Positive for dizziness  Negative for weakness, light-headedness, numbness and headaches  Psychiatric/Behavioral: Positive for decreased concentration  Negative for sleep disturbance  The patient is not nervous/anxious  Objective:      /70   Temp 98 °F (36 7 °C)   Wt 88 5 kg (195 lb)   BMI 25 55 kg/m²          Physical Exam  Constitutional:       General: He is not in acute distress  Appearance: Normal appearance  He is well-developed and normal weight   He is not ill-appearing  HENT:      Head: Normocephalic  Right Ear: Tympanic membrane and external ear normal       Left Ear: Tympanic membrane and external ear normal       Nose: Nose normal       Mouth/Throat:      Mouth: Mucous membranes are moist       Pharynx: Oropharynx is clear  No oropharyngeal exudate  Eyes:      General:         Right eye: No discharge  Left eye: No discharge  Extraocular Movements: Extraocular movements intact  Conjunctiva/sclera: Conjunctivae normal       Pupils: Pupils are equal, round, and reactive to light  Neck:      Thyroid: No thyroid mass or thyromegaly  Cardiovascular:      Rate and Rhythm: Normal rate and regular rhythm  Heart sounds: Normal heart sounds  No murmur heard  Pulmonary:      Effort: Pulmonary effort is normal  No respiratory distress  Breath sounds: Normal breath sounds  No wheezing or rales  Abdominal:      General: Bowel sounds are normal  There is no distension  Palpations: Abdomen is soft  There is no mass  Tenderness: There is no abdominal tenderness  There is no guarding  Musculoskeletal:      Cervical back: Normal range of motion and neck supple  Lymphadenopathy:      Cervical: No cervical adenopathy  Skin:     General: Skin is warm  Neurological:      Mental Status: He is alert        Deep Tendon Reflexes: Reflexes normal    Psychiatric:         Mood and Affect: Mood normal          Behavior: Behavior normal

## 2022-06-09 LAB
ALBUMIN SERPL-MCNC: 4.4 G/DL (ref 4.1–5.2)
ALBUMIN/GLOB SERPL: 1.8 {RATIO} (ref 1.2–2.2)
ALP SERPL-CCNC: 77 IU/L (ref 44–121)
ALT SERPL-CCNC: 20 IU/L (ref 0–44)
AST SERPL-CCNC: 20 IU/L (ref 0–40)
BASOPHILS # BLD AUTO: 0.1 X10E3/UL (ref 0–0.2)
BASOPHILS NFR BLD AUTO: 1 %
BILIRUB SERPL-MCNC: 0.4 MG/DL (ref 0–1.2)
BUN SERPL-MCNC: 21 MG/DL (ref 6–20)
BUN/CREAT SERPL: 19 (ref 9–20)
CALCIUM SERPL-MCNC: 9.6 MG/DL (ref 8.7–10.2)
CHLORIDE SERPL-SCNC: 102 MMOL/L (ref 96–106)
CO2 SERPL-SCNC: 25 MMOL/L (ref 20–29)
CREAT SERPL-MCNC: 1.12 MG/DL (ref 0.76–1.27)
CRP SERPL-MCNC: <1 MG/L (ref 0–10)
EBV NA IGG SER IA-ACNC: <18 U/ML (ref 0–17.9)
EBV VCA IGG SER IA-ACNC: <18 U/ML (ref 0–17.9)
EBV VCA IGM SER IA-ACNC: <36 U/ML (ref 0–35.9)
EGFR: 96 ML/MIN/1.73
EOSINOPHIL # BLD AUTO: 0.1 X10E3/UL (ref 0–0.4)
EOSINOPHIL NFR BLD AUTO: 4 %
ERYTHROCYTE [DISTWIDTH] IN BLOOD BY AUTOMATED COUNT: 12.3 % (ref 11.6–15.4)
ERYTHROCYTE [SEDIMENTATION RATE] IN BLOOD BY WESTERGREN METHOD: 2 MM/HR (ref 0–15)
FERRITIN SERPL-MCNC: 187 NG/ML (ref 30–400)
GLOBULIN SER-MCNC: 2.4 G/DL (ref 1.5–4.5)
GLUCOSE SERPL-MCNC: 83 MG/DL (ref 65–99)
HCT VFR BLD AUTO: 39.9 % (ref 37.5–51)
HGB BLD-MCNC: 13.4 G/DL (ref 13–17.7)
IMM GRANULOCYTES # BLD: 0 X10E3/UL (ref 0–0.1)
IMM GRANULOCYTES NFR BLD: 0 %
INTERPRETATION: NORMAL
IRON SATN MFR SERPL: 27 % (ref 15–55)
IRON SERPL-MCNC: 76 UG/DL (ref 38–169)
LYMPHOCYTES # BLD AUTO: 1.4 X10E3/UL (ref 0.7–3.1)
LYMPHOCYTES NFR BLD AUTO: 40 %
MCH RBC QN AUTO: 30.5 PG (ref 26.6–33)
MCHC RBC AUTO-ENTMCNC: 33.6 G/DL (ref 31.5–35.7)
MCV RBC AUTO: 91 FL (ref 79–97)
MONOCYTES # BLD AUTO: 0.3 X10E3/UL (ref 0.1–0.9)
MONOCYTES NFR BLD AUTO: 9 %
NEUTROPHILS # BLD AUTO: 1.7 X10E3/UL (ref 1.4–7)
NEUTROPHILS NFR BLD AUTO: 46 %
PLATELET # BLD AUTO: 202 X10E3/UL (ref 150–450)
POTASSIUM SERPL-SCNC: 4.6 MMOL/L (ref 3.5–5.2)
PROT SERPL-MCNC: 6.8 G/DL (ref 6–8.5)
RBC # BLD AUTO: 4.4 X10E6/UL (ref 4.14–5.8)
SODIUM SERPL-SCNC: 139 MMOL/L (ref 134–144)
T4 FREE SERPL DIALY-MCNC: 1 NG/DL
TIBC SERPL-MCNC: 279 UG/DL (ref 250–450)
TSH SERPL-ACNC: 1.1 UU/ML
UIBC SERPL-MCNC: 203 UG/DL (ref 111–343)
WBC # BLD AUTO: 3.5 X10E3/UL (ref 3.4–10.8)

## 2022-06-13 NOTE — RESULT ENCOUNTER NOTE
I have reviewed all lab results which are normal or stable  Please inform the patient  The fatigue could be secondary to the gastroenteritis that he had a over 1 month  If it does not resolve the next step would be for a rheumatologist to evaluate [facility]

## 2022-07-28 ENCOUNTER — CONSULT (OUTPATIENT)
Dept: CARDIOLOGY CLINIC | Facility: CLINIC | Age: 22
End: 2022-07-28
Payer: COMMERCIAL

## 2022-07-28 VITALS
SYSTOLIC BLOOD PRESSURE: 110 MMHG | DIASTOLIC BLOOD PRESSURE: 64 MMHG | WEIGHT: 205 LBS | TEMPERATURE: 97.8 F | HEART RATE: 64 BPM | OXYGEN SATURATION: 97 % | HEIGHT: 73 IN | BODY MASS INDEX: 27.17 KG/M2

## 2022-07-28 DIAGNOSIS — R00.2 PALPITATION: ICD-10-CM

## 2022-07-28 DIAGNOSIS — R07.9 CHEST PAIN, UNSPECIFIED TYPE: Primary | ICD-10-CM

## 2022-07-28 PROCEDURE — 93000 ELECTROCARDIOGRAM COMPLETE: CPT | Performed by: INTERNAL MEDICINE

## 2022-07-28 PROCEDURE — 99213 OFFICE O/P EST LOW 20 MIN: CPT | Performed by: INTERNAL MEDICINE

## 2022-07-28 NOTE — PROGRESS NOTES
Consultation - Cardiology Office  [facility]  Wiley Strickland 24 y o  male MRN: [id number]  : 2000  Unit/Bed#:  Encounter: [id number]      ASSESSMENT:  Chest Tightness    Palpitations      RECOMMENDATIONS:  Echocardiogram  Exercise stress test  30 day ambulatory cardiac monitor        Thank you for your consultation  If you have any question please call me at [phone]      Primary Care Physician Requesting Consult: Samina Goode MD      Reason for Consult / Principal Problem:  Chest tightness and palpitations        HPI :     Rylee Miguel is a 24y o  year old male who was referred by primary care doctor for evaluation of cardiac symptoms that developed shortly after COVID vaccine  This includes chest tightness initially after exercise at and subsequently arbitrarily  He has also been having palpitations which she describes as forceful heartbeats, not rapid, that has also been occurring intermittently even at rest       Review of Systems   Respiratory: Positive for chest tightness  Cardiovascular: Positive for palpitations  All other systems reviewed and are negative        Historical Information   Past Medical History:   Diagnosis Date    Acute sinusitis 2015    Asthma     Bloody diarrhea 2022    Croup 2017    Rectal bleeding 2019     Past Surgical History:   Procedure Laterality Date    CIRCUMCISION      HERNIA REPAIR       Social History     Substance and Sexual Activity   Alcohol Use Yes    Comment: 2 times a month     Social History     Substance and Sexual Activity   Drug Use Never     Social History     Tobacco Use   Smoking Status Never Smoker   Smokeless Tobacco Never Used     Family History:   Family History   Problem Relation Age of Onset    No Known Problems Mother     Other Father         GI Polyps       Meds/Allergies     Allergies   Allergen Reactions    Amoxicillin Rash    Amoxicillin-Pot Clavulanate Rash Current Outpatient Medications:     albuterol (PROVENTIL HFA,VENTOLIN HFA) 90 mcg/act inhaler, Inhale 1-2 puffs, Disp: , Rfl:     fluticasone (FLOVENT HFA) 110 MCG/ACT inhaler, Inhale 2 puffs 2 (two) times a day Rinse mouth after use , Disp: , Rfl:     PAZEO 0 7 % SOLN, , Disp: , Rfl:     Vitals: Blood pressure 110/64, pulse 64, temperature 97 8 °F (36 6 °C), height 6' 1 25" (1 861 m), weight 93 kg (205 lb), SpO2 97 %  ?  Body mass index is 26 86 kg/m²  Vitals:    07/28/22 0921   Weight: 93 kg (205 lb)     BP Readings from Last 3 Encounters:   07/28/22 110/64   06/03/22 118/70   05/18/22 118/72       PHYSICAL EXAMINATION:  Neurologic:  Alert & oriented x 3, no new focal deficits, Not in any acute distress,  Constitutional:  Well developed, well nourished, non-toxic appearance   Eyes:  Pupil equal and reacting to light, conjunctiva normal, No JVP, No LNP   HENT:  Atraumatic, oropharynx moist, Neck- normal range of motion, no tenderness,  Neck supple   Respiratory:  Bilateral air entry, mostly clear to auscultation  Cardiovascular: S1-S2 regular with a I/VI systolic murmur   GI:  Soft, nondistended, normal bowel sounds, nontender, no hepatosplenomegaly appreciated  Musculoskeletal: no tenderness, no deformities  Skin:  Well hydrated, no rash   Lymphatic:  No lymphadenopathy noted   Extremities:  No edema and distal pulses are present    Diagnostic Studies Review Cardio:      EKG:  Normal sinus rhythm, heart rate 64 minutes    Cardiac testing:   No results found for this or any previous visit  Imaging:  Chest X-Ray:   XR chest pa & lateral    Result Date: 1/5/2022  Impression No acute cardiopulmonary disease  Workstation performed: [id number]       CT-scan of the chest:     No CTA results available for this patient    Lab Review   Lab Results   Component Value Date    WBC 3 5 06/03/2022    HGB 13 4 06/03/2022    HCT 39 9 06/03/2022    MCV 91 06/03/2022    RDW 12 3 06/03/2022     06/03/2022 BMP:  Lab Results   Component Value Date    SODIUM 139 06/03/2022    K 4 6 06/03/2022     06/03/2022    CO2 25 06/03/2022    BUN 21 (H) 06/03/2022    CREATININE 1 12 06/03/2022    GLUC 83 06/03/2022    EGFR 96 06/03/2022     LFT:  Lab Results   Component Value Date    AST 20 06/03/2022    ALT 20 06/03/2022    TP 6 8 06/03/2022    ALB 4 4 06/03/2022      No results found for: [id number]  No components found for: TSH3  No results found for: HGBA1C  Lipid Profile:   Lab Results   Component Value Date    CHOLESTEROL 137 06/21/2021    HDL 57 06/21/2021    LDLCALC 69 06/21/2021    TRIG 48 06/21/2021     Lab Results   Component Value Date    CHOLESTEROL 137 06/21/2021     No results found for: CKTOTAL, CKMB, CKMBINDEX, TROPONINI  No results found for: NTBNP   No results found for this or any previous visit (from the past 672 hour(s))  Dr Maria Victoria Torres MD, [facility]      "This note has been constructed using a voice recognition system  Therefore there may be syntax, spelling, and/or grammatical errors   Please call if you have any questions  "

## 2022-08-18 ENCOUNTER — HOSPITAL ENCOUNTER (OUTPATIENT)
Dept: NON INVASIVE DIAGNOSTICS | Facility: HOSPITAL | Age: 22
Discharge: HOME | End: 2022-08-18
Attending: INTERNAL MEDICINE
Payer: COMMERCIAL

## 2022-08-18 VITALS
HEIGHT: 73 IN | HEART RATE: 62 BPM | WEIGHT: 205 LBS | DIASTOLIC BLOOD PRESSURE: 65 MMHG | SYSTOLIC BLOOD PRESSURE: 116 MMHG | BODY MASS INDEX: 27.17 KG/M2

## 2022-08-18 DIAGNOSIS — R07.9 CHEST PAIN, UNSPECIFIED TYPE: ICD-10-CM

## 2022-08-18 DIAGNOSIS — R00.2 PALPITATION: ICD-10-CM

## 2022-08-18 LAB
AORTIC ROOT: 2.8 CM
BASELINE ST DEPRESSION: 0 MM
CHEST PAIN STATEMENT: NORMAL
E WAVE DECELERATION TIME: 184 MS
FRACTIONAL SHORTENING: 28 % (ref 28–44)
INTERVENTRICULAR SEPTUM IN DIASTOLE (PARASTERNAL SHORT AXIS VIEW): 0.9 CM
INTERVENTRICULAR SEPTUM: 0.9 CM (ref 0.6–1.1)
LAAS-AP2: 16.9 CM2
LAAS-AP4: 18.4 CM2
LEFT ATRIUM SIZE: 4.2 CM
LEFT INTERNAL DIMENSION IN SYSTOLE: 4.3 CM (ref 2.1–4)
LEFT VENTRICULAR INTERNAL DIMENSION IN DIASTOLE: 6 CM (ref 3.5–6)
LEFT VENTRICULAR POSTERIOR WALL IN END DIASTOLE: 1 CM
LEFT VENTRICULAR STROKE VOLUME: 97 ML
LVSV (TEICH): 97 ML
MAX DIASTOLIC BP: 70 MMHG
MAX HEART RATE: 181 BPM
MAX HR PERCENT: 90 %
MAX HR: 181 BPM
MAX PREDICTED HEART RATE: 199 BPM
MAX. SYSTOLIC BP: 160 MMHG
MV E'TISSUE VEL-LAT: 20 CM/S
MV E'TISSUE VEL-SEP: 17 CM/S
MV PEAK A VEL: 0.42 M/S
MV PEAK E VEL: 74 CM/S
MV STENOSIS PRESSURE HALF TIME: 53 MS
MV VALVE AREA P 1/2 METHOD: 4.15 CM2
PROTOCOL NAME: NORMAL
RATE PRESSURE PRODUCT: NORMAL
REASON FOR TERMINATION: NORMAL
RIGHT VENTRICLE ID DIMENSION: 4.1 CM
SL CV LEFT ATRIUM LENGTH A2C: 4.3 CM
SL CV LV EF: 55
SL CV PED ECHO LEFT VENTRICLE DIASTOLIC VOLUME (MOD BIPLANE) 2D: 182 ML
SL CV PED ECHO LEFT VENTRICLE SYSTOLIC VOLUME (MOD BIPLANE) 2D: 85 ML
SL CV STRESS RECOVERY BP: NORMAL MMHG
SL CV STRESS RECOVERY HR: 98 BPM
SL CV STRESS RECOVERY O2 SAT: 97 %
SL CV STRESS STAGE REACHED: 5
STRESS ANGINA INDEX: 0
STRESS BASELINE BP: NORMAL MMHG
STRESS BASELINE HR: 70 BPM
STRESS DUKE TREADMILL SCORE: 13
STRESS O2 SAT REST: 98 %
STRESS PEAK HR: 181 BPM
STRESS POST ESTIMATED WORKLOAD: 17.2 METS
STRESS POST EXERCISE DUR MIN: 13 MIN
STRESS POST O2 SAT PEAK: 98 %
STRESS POST PEAK BP: 160 MMHG
STRESS ST DEPRESSION: 0 MM
TARGET HR FORMULA: NORMAL
TEST INDICATION: NORMAL
TIME IN EXERCISE PHASE: NORMAL
TR MAX PG: 22 MMHG
TR PEAK VELOCITY: 2.4 M/S
TRICUSPID VALVE PEAK REGURGITATION VELOCITY: 2.37 M/S

## 2022-08-18 PROCEDURE — 93016 CV STRESS TEST SUPVJ ONLY: CPT | Performed by: INTERNAL MEDICINE

## 2022-08-18 PROCEDURE — 93306 TTE W/DOPPLER COMPLETE: CPT | Performed by: INTERNAL MEDICINE

## 2022-08-18 PROCEDURE — 93018 CV STRESS TEST I&R ONLY: CPT | Performed by: INTERNAL MEDICINE

## 2022-08-18 PROCEDURE — 93306 TTE W/DOPPLER COMPLETE: CPT

## 2022-08-18 PROCEDURE — 93017 CV STRESS TEST TRACING ONLY: CPT

## 2022-08-19 ENCOUNTER — TELEPHONE (OUTPATIENT)
Dept: CARDIOLOGY CLINIC | Facility: CLINIC | Age: 22
End: 2022-08-19

## 2022-08-19 NOTE — TELEPHONE ENCOUNTER
----- Message from Josesito Treviño MD sent at 8/19/2022  8:35 AM EDT -----  Please call and inform the patient that the Echocardiogram showed normal pumping function of the heart  Chamber sizes were upper normal which could be due to regular physical exercise  No significant valve abnormality was seen    Will discuss further at next office visit

## 2022-09-07 ENCOUNTER — CLINICAL SUPPORT (OUTPATIENT)
Dept: CARDIOLOGY CLINIC | Facility: CLINIC | Age: 22
End: 2022-09-07
Payer: COMMERCIAL

## 2022-09-07 DIAGNOSIS — R00.2 PALPITATION: ICD-10-CM

## 2022-09-07 PROCEDURE — 93228 REMOTE 30 DAY ECG REV/REPORT: CPT | Performed by: INTERNAL MEDICINE

## 2022-09-08 ENCOUNTER — TELEPHONE (OUTPATIENT)
Dept: CARDIOLOGY CLINIC | Facility: CLINIC | Age: 22
End: 2022-09-08

## 2022-09-08 NOTE — TELEPHONE ENCOUNTER
----- Message from Ab Correia MD sent at 9/8/2022 10:05 AM EDT -----  Wireless event monitoring was performed for evaluation of palpitations    Underlying rhythm was sinus  Average heart rate was 68 per minute  Lowest heart rate was 36 at 6:53 a m  Mario Ocampo There was a 2 8 ventricular pause at 6:53 a m  The slower heart rates and 2 8 sec  pause occurred during early morning hours  The highest heart rate was 180 at 11:24 a m     There were no episodes of atrial fibrillation/flutter, SVT, VT or significant heart blocks noted  Two patient triggered episodes for chest pain correlated with sinus rhythm and no significant ST segment abnormality

## 2022-09-20 DIAGNOSIS — R00.1 BRADYCARDIA: Primary | ICD-10-CM

## 2022-09-20 NOTE — TELEPHONE ENCOUNTER
Pt made aware of heart monitor results  Pt  Made aware of heart monitor results  Pt would like a referral to evaluated for Sleep Study

## 2022-09-21 ENCOUNTER — TELEPHONE (OUTPATIENT)
Dept: CARDIOLOGY CLINIC | Facility: CLINIC | Age: 22
End: 2022-09-21

## 2022-09-21 NOTE — TELEPHONE ENCOUNTER
Patient called stating that [facility] closest appointment for a sleep study is 12/4/22  I mailed the referral to him to [facility]

## 2022-09-21 NOTE — TELEPHONE ENCOUNTER
Called patient and is scheduled for December for his f/u with Dr Casandra Martinez   ([facility] by dr Casandra Martinez) [No Acute Distress] : no acute distress [Well Nourished] : well nourished [Well Developed] : well developed [Well-Appearing] : well-appearing [Normal Sclera/Conjunctiva] : normal sclera/conjunctiva [PERRL] : pupils equal round and reactive to light [EOMI] : extraocular movements intact [Normal Outer Ear/Nose] : the outer ears and nose were normal in appearance [Normal Oropharynx] : the oropharynx was normal [No JVD] : no jugular venous distention [No Lymphadenopathy] : no lymphadenopathy [Supple] : supple [Thyroid Normal, No Nodules] : the thyroid was normal and there were no nodules present [No Respiratory Distress] : no respiratory distress  [No Accessory Muscle Use] : no accessory muscle use [Clear to Auscultation] : lungs were clear to auscultation bilaterally [Normal Rate] : normal rate  [Regular Rhythm] : with a regular rhythm [Normal S1, S2] : normal S1 and S2 [No Murmur] : no murmur heard [No Carotid Bruits] : no carotid bruits [No Abdominal Bruit] : a ~M bruit was not heard ~T in the abdomen [No Varicosities] : no varicosities [Pedal Pulses Present] : the pedal pulses are present [No Edema] : there was no peripheral edema [No Palpable Aorta] : no palpable aorta [No Extremity Clubbing/Cyanosis] : no extremity clubbing/cyanosis [Soft] : abdomen soft [Non Tender] : non-tender [Non-distended] : non-distended [No Masses] : no abdominal mass palpated [No HSM] : no HSM [Normal Bowel Sounds] : normal bowel sounds [Normal Posterior Cervical Nodes] : no posterior cervical lymphadenopathy [Normal Anterior Cervical Nodes] : no anterior cervical lymphadenopathy [No CVA Tenderness] : no CVA  tenderness [No Spinal Tenderness] : no spinal tenderness [No Joint Swelling] : no joint swelling [Grossly Normal Strength/Tone] : grossly normal strength/tone [No Rash] : no rash [Coordination Grossly Intact] : coordination grossly intact [No Focal Deficits] : no focal deficits [Normal Gait] : normal gait [Deep Tendon Reflexes (DTR)] : deep tendon reflexes were 2+ and symmetric [Normal Affect] : the affect was normal [Normal Insight/Judgement] : insight and judgment were intact

## 2022-09-21 NOTE — TELEPHONE ENCOUNTER
Patient called today for appt for follow up  He reports that he will be back from college around thanksgiving

## 2022-10-10 ENCOUNTER — TELEPHONE (OUTPATIENT)
Dept: CARDIOLOGY CLINIC | Facility: CLINIC | Age: 22
End: 2022-10-10

## 2022-10-10 ENCOUNTER — TELEPHONE (OUTPATIENT)
Dept: PEDIATRICS CLINIC | Age: 22
End: 2022-10-10

## 2022-10-10 NOTE — TELEPHONE ENCOUNTER
School is requiring for Samira Villalobos to get the [employer] by next month  Samira Villalobos did talk to his cardiologist about the Covid vaccination  His tests for the heart were normal  The cardiologist is leaving it up to me to waive the vaccination  After receiving the last Covid vaccination her developed intermittent chest that latest about 4-5 month  The chest pain interrupted his physical activity  He is requesting a medical exemption from the vaccination  I agreed to write for the exemption

## 2022-10-10 NOTE — TELEPHONE ENCOUNTER
Please type up a medical exemption for [facility]  The letter must specify that he is medically exempt from the [place] -19 vaccination booster  The reason for the the exemption is secondary to chest pain  One week after receiving the last COVID-19 vaccination [facility] experienced unexplained chest pain  The chest pain lasted intermittently for 4-5 months  The chest pain was mostly present with exercise  [facility] was seen by cardiology and had a clear cardiac work up  [facility] is exempt from the COVID-19 booster for at least the next year

## 2022-10-10 NOTE — TELEPHONE ENCOUNTER
Patient is requesting a call from dr Bharti Owen to ask him some important questions   May be reached at [phone]

## 2022-10-11 ENCOUNTER — TELEPHONE (OUTPATIENT)
Dept: PSYCHIATRY | Facility: CLINIC | Age: 22
End: 2022-10-11

## 2022-10-11 NOTE — TELEPHONE ENCOUNTER
left message for pt to return call to intake in regards to scheduling services in [facility]

## 2022-10-12 ENCOUNTER — TELEPHONE (OUTPATIENT)
Dept: PSYCHIATRY | Facility: CLINIC | Age: 22
End: 2022-10-12

## 2022-10-12 PROBLEM — Z00.00 WELL ADULT EXAM: Status: RESOLVED | Noted: 2021-06-01 | Resolved: 2022-10-12

## 2022-10-12 NOTE — TELEPHONE ENCOUNTER
left message for pt to return call to intake in regards to scheduling services in [facility]

## 2022-11-03 ENCOUNTER — TELEPHONE (OUTPATIENT)
Dept: CARDIOLOGY CLINIC | Facility: CLINIC | Age: 22
End: 2022-11-03

## 2022-11-03 ENCOUNTER — TELEPHONE (OUTPATIENT)
Dept: SLEEP CENTER | Facility: CLINIC | Age: 22
End: 2022-11-03

## 2022-11-03 NOTE — TELEPHONE ENCOUNTER
Patient called to state that he had a sleep study completed and that they had advised he make an appointment with us  Patient was educated on how to send results to our office and that I would forward that message on to Dr Kwaku Crowell for advice on evaluation and treatment

## 2022-11-03 NOTE — TELEPHONE ENCOUNTER
----- Message from Raquel Allan MD sent at 11/2/2022  9:17 PM EDT -----  Janeen Arndt      ----- Message -----  From: Isabelle Garner  Sent: 8/20/1581  10:12 AM EDT  To: Sleep Medicine Nayan Provider    This home sleep study needs approval      If approved please sign and return to clerical pool  If denied please include reasons why  Also provide alternative testing if warranted  Please sign and return to clerical pool

## 2022-12-14 ENCOUNTER — TELEPHONE (OUTPATIENT)
Dept: UROLOGY | Facility: AMBULATORY SURGERY CENTER | Age: 22
End: 2022-12-14

## 2022-12-14 NOTE — TELEPHONE ENCOUNTER
Please Triage  New Patient    What is the reason for the patient’s appointment? Patient calling to schedule with urology due to pain during erections  Patient stated this has been going on for many years  [facility]     Imaging/Lab Results: n/a    Do we accept the patient's insurance or is the patient Self-Pay? Yes, [address] Provider:  Plan Type/Number:  Member ID#: Has the patient had any previous Urologist(s)? No    Have patient records been requested? If not are records showing in Epic:     Has the patient had any outside testing done? n/a    Does the patient have a personal history of cancer?  No    Patient scheduled 12/28/22 at 10 am with Ai Gomez in [place]

## 2022-12-19 ENCOUNTER — CONSULT (OUTPATIENT)
Dept: PULMONOLOGY | Facility: CLINIC | Age: 22
End: 2022-12-19

## 2022-12-19 VITALS
OXYGEN SATURATION: 98 % | WEIGHT: 208 LBS | HEIGHT: 73 IN | DIASTOLIC BLOOD PRESSURE: 74 MMHG | SYSTOLIC BLOOD PRESSURE: 122 MMHG | TEMPERATURE: 97.4 F | BODY MASS INDEX: 27.57 KG/M2 | HEART RATE: 65 BPM

## 2022-12-19 DIAGNOSIS — G47.19 EXCESSIVE DAYTIME SLEEPINESS: ICD-10-CM

## 2022-12-19 DIAGNOSIS — G47.33 OSA (OBSTRUCTIVE SLEEP APNEA): Primary | ICD-10-CM

## 2022-12-19 DIAGNOSIS — G47.33 OBSTRUCTIVE SLEEP APNEA: ICD-10-CM

## 2022-12-19 NOTE — ASSESSMENT & PLAN NOTE
His [facility] sleep study showed a very early cluster of sleep apnea with significant oxygen desaturations  This may represent a period of REM and if indeed REM it is likely a sleep onset REM period  However on a home study this is impossible to tell  If he is stable on CPAP and still has excessive daytime sleepiness I would consider an MSLT with CPAP

## 2022-12-19 NOTE — PROGRESS NOTES
Sleep Consultation   Meagan Strickland 25 y o  male MRN: [id number]      Reason for consultation: Excessive daytime sleepiness      Requesting physician: Dalila Lira MD (Cardiology)    Assessment/Plan    Jenny Mcdaniel is a 25 y o  male with a past medical history of asthma who presents for evaluation of excessive daytime sleepiness and tiredness  1   Mild obstructive sleep apnea   Symptoms: Choking and gasping during sleep, witnessed apneas, excessive daytime sleepiness, tiredness, unrefreshed sleep  Mallampati class 3/4, Body mass index is 27 44 kg/m² , Neck Circumference: 15 (in)  [place] score 17  Home sleep study on 10/26/2022 ordered by cardiologist showed GURU of 7 7 (obstructive apnea index 6 3, hypopnea index 1 5)  Oxygen saturation john 85%, oxygen saturation 88% or less for 0 9 minutes  Patient slept in prone position for the entire night    Plan  As patient is significantly symptomatic with an [place] score of 17, ordered auto CPAP with a pressure range of 5-20 cm H2O  Follow-up for compliance 1 to 2 months after starting CPAP    2  Excessive daytime sleepiness  Patient reports hypnopompic hallucinations where he sees shadowy figure  He describes episodes where he wakes up from sleep and is unable to move his arm, it is usually only 1 arm  Denies cataplexy  Hypnopompic hallucinations and possible sleep paralysis occur 2 times a year  He states he was sleepwalking as a child  It occurred once a month but has not occurred in the past 10 years  He frequently talks in his sleep    Plan  Mild concern for narcolepsy, will consider MSLT if continued excessive daytime sleepiness while on CPAP    3    Sleep movements  Patient states he notices pillows across the room and sheets on the floor when he wakes up in the morning  He has a history of sleepwalking as a child that occurred once a month but has not occurred in the past 10 years  He talks in his sleep  He sometimes wakes up and the lights are on but he does not remember turning them on    Plan  Will continue to monitor at this time as episodes occur infrequently  Counseled patient on safety precautions that need to be taken      History of Present Illness   HPI:  Eliud Lo is a 25 y o  male with a past medical history of asthma who presents for evaluation of excessive daytime sleepiness and tiredness  He recently had home sleep study done which showed mild obstructive sleep apnea with an GURU of 7 7  He complains of excessive daytime sleepiness and tiredness  He has [place] score of 17  He was referred by his cardiologist after an episode of chest pain after receiving the COVID-vaccine  He complains of unrefreshed sleep  He complains of some restless leg symptoms occasionally but they occur infrequently  He wakes up choking and gasping for air  His parents have noticed episodes where he stops breathing  He describes hallucinations upon waking up from sleep where he sees a shadowy figure that does not go away until he turns the lights on  He states this occurs 2 times a year  He describes episodes where 1 arm will be numb and he is unable to move it  He was sleepwalking as a child and that occurred once a month  He states it has not occurred in the past 10 years  He is still sleep talking  Denies cataplexy  Occasionally when he wakes up he sees his pillows across the room or sheets on the floor  This occurs once a year  He sometimes wakes up and the lights are on and he does not remember turning them on  He has recently gained 10 pounds  He states he is a mouth breather  Sleep history: Patient goes to bed at 1130 and falls asleep right away usually  He wakes up at 7:30 AM   He averages 7 hours of sleep per night  He has 2-3 nighttime awakenings to urinate but falls back asleep right away  He does not use any medications to help him sleep  He is a student at [employer]    No relevant family history      Review of Systems      Genitourinary need to urinate more than twice a night   Cardiology palpitations/fluttering feeling in the chest   Gastrointestinal none   Neurology awaken with headache, forgetfulness, poor concentration or confusion,  and balance problems   Constitutional fatigue   Integumentary none   Psychiatry anxiety, depression, aggressiveness or irritability and mood change   Musculoskeletal none   Pulmonary wheezing and difficulty breathing when lying flat    ENT none   Endocrine excessive thirst and frequent urination   Hematological none               Historical Information   Past Medical History:   Diagnosis Date   • Acute sinusitis 12/14/2015   • Asthma    • Bloody diarrhea 5/18/2022   • Croup 12/26/2017   • Rectal bleeding 4/22/2019     Past Surgical History:   Procedure Laterality Date   • CIRCUMCISION     • HERNIA REPAIR       Family History   Problem Relation Age of Onset   • No Known Problems Mother    • Other Father         GI Polyps     Social History     Socioeconomic History   • Marital status: Single     Spouse name: Not on file   • Number of children: Not on file   • Years of education: Not on file   • Highest education level: Not on file   Occupational History   • Not on file   Tobacco Use   • Smoking status: Never   • Smokeless tobacco: Never   Vaping Use   • Vaping Use: Never used   Substance and Sexual Activity   • Alcohol use: Yes     Comment: 2 times a month   • Drug use: Never   • Sexual activity: Not Currently   Other Topics Concern   • Not on file   Social History Narrative    4th year of College 2022    Goes to the gym 5 days a week    No pets     Social Determinants of Health     Financial Resource Strain: Not on file   Food Insecurity: Not on file   Transportation Needs: Not on file   Physical Activity: Not on file   Stress: Not on file   Social Connections: Not on file   Intimate Partner Violence: Not on file   Housing Stability: Not on file       Occupational History: Student Meds/Allergies   Allergies   Allergen Reactions   • Amoxicillin Rash   • Amoxicillin-Pot Clavulanate Rash       Home medications:  Prior to Admission medications    Medication Sig Start Date End Date Taking? Authorizing Provider   albuterol (PROVENTIL HFA,VENTOLIN HFA) 90 mcg/act inhaler Inhale 1-2 puffs  Patient not taking: Reported on 12/19/2022 12/26/17   Historical Provider, MD   fluticasone (FLOVENT HFA) 110 MCG/ACT inhaler Inhale 2 puffs 2 (two) times a day Rinse mouth after use  Patient not taking: Reported on 12/19/2022    Historical Provider, MD   PAZEO 0 7 % SOLN  3/20/19   Historical Provider, MD       Vitals:   Blood pressure 122/74, pulse 65, temperature (!) 97 4 °F (36 3 °C), height 6' 1" (1 854 m), weight 94 3 kg (208 lb), SpO2 98 %  ,  Body mass index is 27 44 kg/m²  Neck Circumference: 15 (in)    Physical Exam  General:  Awake alert and oriented x 3, conversant without conversational dyspnea, NAD, normal affect  HEENT:  PERRL, Sclera noninjected, nonicteric OU, Nares patent,  no craniofacial abnormalities, Mucous membranes, moist, no oral lesions, normal dentition, Mallampati class 3  NECK: Trachea midline, no accessory muscle use, no stridor, no cervical or supraclavicular adenopathy, JVP not elevated  CARDIAC: Reg, single s1/S2, no m/r/g  PULM: CTA bilaterally no wheezing, rhonchi or rales  EXT: No cyanosis, no clubbing, no edema, normal capillary refill  NEURO: no focal neurologic deficits, AAOx3, moving all extremities appropriately    Labs: I have personally reviewed pertinent lab results    Lab Results   Component Value Date    WBC 3 5 06/03/2022    HGB 13 4 06/03/2022    HCT 39 9 06/03/2022    MCV 91 06/03/2022     06/03/2022      Lab Results   Component Value Date    K 4 6 06/03/2022    CO2 25 06/03/2022     06/03/2022    BUN 21 (H) 06/03/2022    CREATININE 1 12 06/03/2022     Lab Results   Component Value Date    IRON 76 06/03/2022    TIBC 279 06/03/2022    FERRITIN 187 06/03/2022     No results found for: BQCGALKV16  No results found for: FOLATE      Arterial Blood Gas result:  NA    Sleep studies:  Diagnostic: Home sleep study on 10/26/2022 ordered by cardiologist showed GURU of 7 7 (obstructive apnea index 6 3, hypopnea index 1 5)  Oxygen saturation john 85%, oxygen saturation 88% or less for 0 9 minutes  Patient slept in prone position for the entire night  Titration:  Split:    Compliance Data:  NA    Home sleep study results:                                         MD Nanda DeanOhio[facility] Hospital[facility]  Sleep Fellow

## 2022-12-19 NOTE — ASSESSMENT & PLAN NOTE
[facility] home study (uploaded in media section) showed GURU 7 7 with oxygen john at 85%  We discussed treatment options and he decided on a trial of auto titrating CPAP to see if that would improve his excessive daytime sleepiness  He warrants treatment for mild JULIO CÉSAR due to excessive daytime sleepiness  Although he is not obese he has risk factors for JULIO CÉSAR including an elevated tongue, crowded airway  We will follow compliance data in 1-2 months after CPAP initiation and see if that improves his excessive daytime sleepiness significantly

## 2022-12-19 NOTE — PATIENT INSTRUCTIONS
Ordered CPAP with pressure range of 5-20   Follow up 1-2 months after starting CPAP   We will consider testing for narcolepsy if you continue to have sleepiness while on CPAP

## 2022-12-27 ENCOUNTER — TELEPHONE (OUTPATIENT)
Dept: PULMONOLOGY | Facility: CLINIC | Age: 22
End: 2022-12-27

## 2022-12-27 NOTE — TELEPHONE ENCOUNTER
Pt has not heard anything from anyone about CPAP or mask fitting, so I am not sure what DME it got sent ot

## 2022-12-27 NOTE — TELEPHONE ENCOUNTER
If patient see Lord Manzo as his DME provider then please schedule him to see annetta for mask fitting

## 2022-12-27 NOTE — TELEPHONE ENCOUNTER
Pt called and lm stating that he was told at his office visit that he would receive a call as to when he can sched an appt for a mask fitting for his CPAP, pt still has not heard anything from [facility] please advise

## 2022-12-28 NOTE — TELEPHONE ENCOUNTER
Adriana Irene,     Can you please review, order placed on 12/19 does not look like it has been placed through [facility]

## 2022-12-28 NOTE — TELEPHONE ENCOUNTER
Spoke with patient aware the order was sent in today since [facility]'s system was down and they did not receive the one from 8 days ago  Patient is going back to school 1/9/2023 at [employer], Will call to update patient with on order status

## 2022-12-28 NOTE — TELEPHONE ENCOUNTER
12/28/22 - Pt LM sofie mckeon  saying he had been told the DME co would call him re CPAP and mask but he's not heard anything else and he'll be going back to school soon and needs to have it  Please review and advise pt of the status re the timing of this

## 2022-12-29 LAB

## 2022-12-29 NOTE — PROGRESS NOTES
Progress Note - Cardiology Office  [facility]    Kevan Strickland 25 y o  male MRN: [id number]  : 2000  Encounter: [id number]    ASSESSMENT:  Chest Tightness    Exercise stress test, 2022:  Negative for ischemia    Echocardiogram, 2022:  EF 55%, mild GABRIEL, trace to mild MR, and TR    Palpitations    Ambulatory cardiac monitor, 2022: Wireless event monitoring was performed for evaluation of palpitations     Underlying rhythm was sinus  Average heart rate was 68 per minute  Lowest heart rate was 36 at 6:53 a m  Cyndia [place] was a 2 8 ventricular pause at 6:53 a m  The slower heart rates and 2 8 sec  pause occurred during early morning hours  The highest heart rate was 180 at 11:24 a m  There were no episodes of atrial fibrillation/flutter, SVT, VT or significant heart blocks noted  Two patient triggered episodes for chest pain correlated with sinus rhythm and no significant ST segment abnormality    Mild obstructive sleep apnea  Patient is seeing pulmonary and is waiting to be fitted with a CPAP     RECOMMENDATIONS:  Follow-up with pulmonary for CPAP  Continue regular cardiovascular exercise  Low-salt diet  Follow-up as needed      Please call [phone] if any questions  HPI :     Iantha Crigler is a 25y o  year old male who came for follow up  He denies any cardiac symptoms  His sleep study revealed that he has mild sleep apnea and he has seen pulmonary and is waiting to be fitted with a CPAP mask  Patient is physically very active    REVIEW OF SYSTEMS:  Denies any cardiac symptoms    Denies chest pain, unusual dyspnea, palpitations or syncope    Historical Information   Past Medical History:   Diagnosis Date   • Acute sinusitis 2015   • Asthma    • Bloody diarrhea 2022   • Croup 2017   • Rectal bleeding 2019     Past Surgical History:   Procedure Laterality Date   • CIRCUMCISION     • HERNIA REPAIR       Social History     Substance and Sexual Activity   Alcohol Use Yes    Comment: 2 times a month     Social History     Substance and Sexual Activity   Drug Use Never     Social History     Tobacco Use   Smoking Status Never   Smokeless Tobacco Never     Family History:   Family History   Problem Relation Age of Onset   • No Known Problems Mother    • Other Father         GI Polyps       Meds/Allergies     Allergies   Allergen Reactions   • Amoxicillin Rash   • Amoxicillin-Pot Clavulanate Rash       Current Outpatient Medications:   •  albuterol (PROVENTIL HFA,VENTOLIN HFA) 90 mcg/act inhaler, Inhale 1-2 puffs (Patient not taking: Reported on 12/19/2022), Disp: , Rfl:   •  fluticasone (FLOVENT HFA) 110 MCG/ACT inhaler, Inhale 2 puffs 2 (two) times a day Rinse mouth after use  (Patient not taking: Reported on 12/19/2022), Disp: , Rfl:   •  PAZEO 0 7 % SOLN, , Disp: , Rfl:     Vitals: Blood pressure 146/70, pulse 93, temperature 98 2 °F (36 8 °C), height 6' 1" (1 854 m), weight 95 3 kg (210 lb), SpO2 99 %  ?  Body mass index is 27 71 kg/m²  Vitals:    12/30/22 1013   Weight: 95 3 kg (210 lb)     BP Readings from Last 3 Encounters:   12/30/22 146/70   12/19/22 122/74   08/18/22 116/65       Physical Exam:    Neurologic:  Alert & oriented x 3, no new focal deficits, Not in any acute distress,  Constitutional:  Well developed, well nourished, non-toxic appearance   Eyes:  Pupil equal and reacting to light, conjunctiva normal,   HENT:  Atraumatic, oropharynx moist, Neck- normal range of motion, no tenderness,  Neck supple, No JVP, No LNP   Respiratory:  Bilateral air entry, mostly clear to auscultation  Cardiovascular: S1-S2 regular with a I/VI ejection systolic murmur   GI:  Soft, nondistended, normal bowel sounds, nontender, no hepatosplenomegaly appreciated  Musculoskeletal: no tenderness, no deformities     Skin:  Well hydrated, no rash   Lymphatic:  No lymphadenopathy noted   Extremities:  No edema       Cardiac testing:       Results for orders placed during the hospital encounter of 08/18/22    Echo complete w/ contrast if indicated    Interpretation Summary  •  Left Ventricle: Left ventricular cavity size is upper normal  Wall thickness is normal  The left ventricular ejection fraction is 55%  Systolic function is normal  Wall motion is normal  Diastolic function is normal for age  •  Right Ventricle: Right ventricular cavity size is mildly dilated by 2D  •  Left Atrium: The atrium is mildly dilated  •  Right Atrium: The atrium is mildly dilated  •  Mitral Valve: There is trace to mild regurgitation  •  Tricuspid Valve: There is trace to mild regurgitation  PA pressure 25 mm Hg  •  Patient has prominent 4 chambers with upper normal to mildly dilated  Could be due to athlete's heart, correlate clinically      Imaging:  Chest X-Ray:   XR chest pa & lateral    Result Date: 1/5/2022  Impression No acute cardiopulmonary disease  Workstation performed: [id number]       CT-scan of the chest:     No CTA results available for this patient    Lab Review   Lab Results   Component Value Date    WBC 3 5 06/03/2022    HGB 13 4 06/03/2022    HCT 39 9 06/03/2022    MCV 91 06/03/2022    RDW 12 3 06/03/2022     06/03/2022     BMP:  Lab Results   Component Value Date    SODIUM 139 06/03/2022    K 4 6 06/03/2022     06/03/2022    CO2 25 06/03/2022    BUN 21 (H) 06/03/2022    CREATININE 1 12 06/03/2022    GLUC 83 06/03/2022    EGFR 96 06/03/2022     LFT:  Lab Results   Component Value Date    AST 20 06/03/2022    ALT 20 06/03/2022    TP 6 8 06/03/2022    ALB 4 4 06/03/2022      No components found for: TSH3  No results found for: [id number]  No results found for: HGBA1C  Lipid Profile:   Lab Results   Component Value Date    CHOLESTEROL 137 06/21/2021    HDL 57 06/21/2021    LDLCALC 69 06/21/2021    TRIG 48 06/21/2021     Lab Results   Component Value Date    CHOLESTEROL 137 06/21/2021     No results found for: CKTOTAL, CKMB, CKMBINDEX, TROPONINI  No results found for: NTBNP   Recent Results (from the past 672 hour(s))   CPAP Package    Collection Time: 12/28/22  8:07 AM   Result Value Ref Range    Supplier Name [facility] [employer]     Supplier Phone Number [phone]     Order Status Pending Authorization     Delivery Note      Delivery Request Date 12/28/2022     Item Description CPAP Machine, Generic     Item Description       PAP Mask, Full Face, Fit Upon Setup, N/A, 1 per 3 months    Item Description       PAP Mask Interface Cushion, Full Face, 1 per 1 month    Item Description PAP Headgear, 1 per 6 months     Item Description PAP Humidifier, Heated     Item Description Disposable PAP Filter, 2 per 1 month     Item Description Non-Disposable PAP Filter, 1 per 6 months     Item Description PAP Machine Tubing, Heated, 1 per 3 months     Item Description PAP Monitoring Modem     Item Description Humidifier Water Chamber, 1 per 6 months     Item Description Standard Non-Heated PAP Tubing, 1 per 3 months     Item Description PAP Chinstrap, 1 per 6 months              Dr Alecia Spain MD, [facility]      "This note has been constructed using a voice recognition system  Therefore there may be syntax, spelling, and/or grammatical errors   Please call if you have any questions  "

## 2022-12-30 ENCOUNTER — OFFICE VISIT (OUTPATIENT)
Dept: CARDIOLOGY CLINIC | Facility: CLINIC | Age: 22
End: 2022-12-30

## 2022-12-30 VITALS
TEMPERATURE: 98.2 F | HEART RATE: 93 BPM | WEIGHT: 210 LBS | HEIGHT: 73 IN | SYSTOLIC BLOOD PRESSURE: 146 MMHG | DIASTOLIC BLOOD PRESSURE: 70 MMHG | OXYGEN SATURATION: 99 % | BODY MASS INDEX: 27.83 KG/M2

## 2022-12-30 DIAGNOSIS — R07.2 PRECORDIAL PAIN: ICD-10-CM

## 2022-12-30 DIAGNOSIS — G47.33 OBSTRUCTIVE SLEEP APNEA HYPOPNEA, MILD: ICD-10-CM

## 2022-12-30 DIAGNOSIS — R00.2 PALPITATIONS: Primary | ICD-10-CM

## 2023-01-05 NOTE — TELEPHONE ENCOUNTER
Spoke with Liz [employer] authorization is still pending  Patient can  his machine at the [facility] health locations since they are being held up by insurance     Insurance ref #[id number]    [address]   (This one is closest to his college)  [address] [place] [address], [facility]  Phone: [employer] [address] [facility], [address] [employer]

## 2023-01-09 ENCOUNTER — TELEPHONE (OUTPATIENT)
Dept: PULMONOLOGY | Facility: CLINIC | Age: 23
End: 2023-01-09

## 2023-01-09 NOTE — TELEPHONE ENCOUNTER
Beth from [employer] called regarding pts CPAP machine, she would like a call back @ [phone]    Reference # is [id number]

## 2023-01-10 NOTE — TELEPHONE ENCOUNTER
Spoke with Gladys Greenberg, states the [employer] is an out of network DME, we would have to send scripts and all info to [facility] for CPAP machine     [facility] Phone: [phone] and Fax: [phone]    Info faxed, will follow up with Gladys Greenberg for approval

## 2023-01-12 LAB

## 2023-01-20 ENCOUNTER — OFFICE VISIT (OUTPATIENT)
Dept: UROLOGY | Facility: AMBULATORY SURGERY CENTER | Age: 23
End: 2023-01-20

## 2023-01-20 VITALS
WEIGHT: 215 LBS | DIASTOLIC BLOOD PRESSURE: 80 MMHG | BODY MASS INDEX: 28.49 KG/M2 | SYSTOLIC BLOOD PRESSURE: 130 MMHG | HEART RATE: 87 BPM | RESPIRATION RATE: 18 BRPM | HEIGHT: 73 IN | OXYGEN SATURATION: 97 %

## 2023-01-20 DIAGNOSIS — N50.811 PAIN IN RIGHT TESTICLE: Primary | ICD-10-CM

## 2023-01-20 NOTE — PROGRESS NOTES
01/20/23    Diane Strickland   2000   [id number]     Assessment  1 Penile/testicular pain   2 Painful erection  3 Curvature of penis    Discussion/Plan  1 Penile/testicular pain    UA, culture   Scrotal US ordered   2 Painful erection  3 Curvature of penis   Vitamin E    Referral to Dr Sofia Teixeira for evaluation of Peyronie's     Await urine studies and imaging  Follow up as needed  Subjective  HPI   Chestine Hyacinth is a 25year old male who presents in consultation for evaluation of penile pain, painful erections, and curvature of the penis down to the right  He has surgical history of right inguinal hernia repair (2018) and circumcision  He played football in high school and experienced trauma while playing contact sports  He is a nonsmoker, social ETOH, no reported drug use  He has had pain for 5-6 years which comes and goes  He experiences sharp pain with morning erections when he blows his nose  It goes away quickly  He is not concerned for STI  Denies family history of  malignancy  No smoking history  He denies lower urinary tract symptoms  Review of Systems - History obtained from chart review and the patient  General ROS: negative  Psychological ROS: negative  Respiratory ROS: no cough, shortness of breath, or wheezing  Cardiovascular ROS: no chest pain or dyspnea on exertion  Gastrointestinal ROS: no abdominal pain, change in bowel habits, or black or bloody stools  Genito-Urinary ROS: positive for - penile curvature/pain  Musculoskeletal ROS: negative  Neurological ROS: no TIA or stroke symptoms  Dermatological ROS: negative       Objective  Physical Exam  Vitals and nursing note reviewed  Constitutional:       General: He is awake  He is not in acute distress  Appearance: Normal appearance  He is well-developed, well-groomed and normal weight  He is not ill-appearing, toxic-appearing or diaphoretic     Pulmonary:      Effort: Pulmonary effort is normal    Abdominal:      Tenderness: There is no right CVA tenderness or left CVA tenderness  Musculoskeletal:         General: Normal range of motion  Cervical back: Normal range of motion and neck supple  Skin:     General: Skin is warm  Neurological:      General: No focal deficit present  Mental Status: He is alert and oriented to person, place, and time  Mental status is at baseline  Psychiatric:         Attention and Perception: Attention normal          Mood and Affect: Mood normal          Speech: Speech normal          Behavior: Behavior normal  Behavior is cooperative  Thought Content: Thought content normal          Cognition and Memory: Cognition normal          Judgment: Judgment normal            SCROTAL ULTRASOUND     INDICATION:    N50 89: Other specified disorders of the male genital organs      COMPARISON: None     TECHNIQUE:   Ultrasound the scrotal contents was performed with a high frequency linear transducer utilizing volumetric sweep imaging as well as standard still image techniques  Imaging performed in longitudinal and transverse orientation  Color and   spectral Doppler evaluation also performed bilaterally      FINDINGS:     TESTES:   Testes are symmetric and normal in size      RIGHT testis = 4 7 x 2 5 x 2 7 cm  Volume 16 8 mL  Normal contour with homogeneous smooth echotexture  No intratesticular mass lesion or calcifications      LEFT testis = 4 5 x 2 6 x 2 8 cm  Volume 16 6 mL  Normal contour with homogeneous smooth echotexture  No intratesticular mass lesion or calcifications      Doppler flow within both testes is present and symmetric      EPIDIDYMIDES:   Normal Size  Doppler ultrasound demonstrates normal blood flow  No epididymal lesions      HYDROCELE:  No significant fluid present      VARICOCELE:  None present      SCROTUM:  Scrotal thickness and appearance within normal limits   No evidence for extratesticular mass or hernia demonstrated      IMPRESSION:     Normal         Carloz Luis M Marci Ruiz have spent 15 minutes with Patient  today in which greater than 50% of this time was spent in counseling/coordination of care regarding Intructions for management

## 2023-01-22 LAB
APPEARANCE UR: CLEAR
BACTERIA UR CULT: NORMAL
BACTERIA URNS QL MICRO: NORMAL
BILIRUB UR QL STRIP: NEGATIVE
CASTS URNS QL MICRO: NORMAL /LPF
COLOR UR: YELLOW
EPI CELLS #/AREA URNS HPF: NORMAL /HPF (ref 0–10)
GLUCOSE UR QL: NEGATIVE
HGB UR QL STRIP: NEGATIVE
KETONES UR QL STRIP: NEGATIVE
LEUKOCYTE ESTERASE UR QL STRIP: NEGATIVE
Lab: NO GROWTH
MICRO URNS: ABNORMAL
MICRO URNS: ABNORMAL
NITRITE UR QL STRIP: NEGATIVE
PH UR STRIP: 6 [PH] (ref 5–7.5)
PROT UR QL STRIP: NEGATIVE
RBC #/AREA URNS HPF: NORMAL /HPF (ref 0–2)
SP GR UR: >=1.03 (ref 1–1.03)
UROBILINOGEN UR STRIP-ACNC: 0.2 MG/DL (ref 0.2–1)
WBC #/AREA URNS HPF: NORMAL /HPF (ref 0–5)

## 2023-02-17 ENCOUNTER — TELEPHONE (OUTPATIENT)
Age: 23
End: 2023-02-17

## 2023-03-24 ENCOUNTER — TELEPHONE (OUTPATIENT)
Dept: PULMONOLOGY | Facility: CLINIC | Age: 23
End: 2023-03-24

## 2023-03-27 ENCOUNTER — TELEMEDICINE (OUTPATIENT)
Dept: PULMONOLOGY | Facility: CLINIC | Age: 23
End: 2023-03-27

## 2023-03-27 DIAGNOSIS — G47.33 OBSTRUCTIVE SLEEP APNEA: Primary | ICD-10-CM

## 2023-03-27 NOTE — PROGRESS NOTES
Progress Note - Sleep Medicine  Orion Alonso Strickland 25 y o  male MRN: [id number]    Telemedicine consent    Patient: Abhijit Gautam  Provider: Blas Granados MD  Provider located at [address] [place] [address]  [phone]    The patient was identified by name and date of birth  Abhijit Gautam was informed that this is a telemedicine visit and that the visit is being conducted through the [employer] [employer]  He agrees to proceed     My office door was closed  No one else was in the room  He acknowledged consent and understanding of privacy and security of the video platform  The patient has agreed to participate and understands they can discontinue the visit at any time  Patient is aware this is a billable service  I spent 30 minutes with the patient during this visit  Impression & Plan:     Abhijit Gautam is a 75-year-old male who presents for follow-up of mild obstructive sleep apnea  1   Mild obstructive sleep apnea  Home sleep study 10/26/2022 showed GURU 7 7  Initial [place] score 17, improved on CPAP  Excellent compliance  Average use 6 hours and 54 minutes  Residual AHI 1 6  Median pressure 6 2, maximum pressure 8 6, 95th percentile 7 7 cm H2O  Currently on AutoPap 6-16 cm H2O    Plan  Change CPAP pressure settings to auto CPAP 6-9 cm H2O  Ordered mask fitting  Change settings online including CPAP pressure  Change tube temperature to 70 °F  Increased humidity to 6, explained to patient that he can change humidity on his own  Increased EPR to 3  Follow-up in 6 months  Recommended patient call the office if he is having any difficulty with changes that were made    2    Excessive daytime sleepiness  He has a history of hypnopompic hallucinations and sleep paralysis  He has not had excessive daytime sleepiness since starting CPAP  Initially there was some concern for narcolepsy but as patient is no longer having daytime sleepiness there is no need for MSLT    3  Sleep movements  History of sleepwalking as a child but has not occurred in the past 10 years  He has noticed pillows across the room and sheets on the floor when he wakes up in the morning  He talks in his sleep    Plan  We will continue to monitor at this time as these episodes occur infrequently  Counseled patient on safety precautions that need to be taken  If patient has daytime sleepiness in the future can consider PLM's as possible cause  Can check iron panel and magnesium      ______________________________________________________________________    HPI:    Neoma Hodgkins is a 35-year-old male who presents for follow-up of mild obstructive sleep apnea  He was diagnosed on home sleep study which showed an GURU of 7 7  At initial presentation he reported choking and gasping during sleep, witnessed apneas, excessive daytime sleepiness with an [place] score of 17, excessive tiredness, and unrefreshing sleep  His symptoms have significantly improved since starting CPAP  He is currently on auto CPAP with a pressure range of 6-16  He feels he is no longer sleepy and tired during the day  He has excellent compliance  He does report some difficulty with the temperature of the air  He feels it is too hot  He does report dry mouth  Patient has a history of sleepwalking as a child  He does wake up and notice pillows and sheets on the floor but this has not occurred recently  He sometimes used to wake up and the lights were on but he does not remember turning them on  There was some concern for narcolepsy at last visit due to hypnopompic hallucinations and sleep paralysis episodes in the past   This has not occurred recently  His daytime sleepiness has improved after starting CPAP  Review of Systems:  Review of Systems   All other systems reviewed and are negative          Social history updates:  Social History Tobacco Use   Smoking Status Never   Smokeless Tobacco Never     Social History     Socioeconomic History   • Marital status: Single     Spouse name: Not on file   • Number of children: Not on file   • Years of education: Not on file   • Highest education level: Not on file   Occupational History   • Not on file   Tobacco Use   • Smoking status: Never   • Smokeless tobacco: Never   Vaping Use   • Vaping Use: Never used   Substance and Sexual Activity   • Alcohol use: Yes     Comment: 2 times a month   • Drug use: Never   • Sexual activity: Not Currently   Other Topics Concern   • Not on file   Social History Narrative    4th year of College 2022    Goes to the gym 5 days a week    No pets     Social Determinants of Health     Financial Resource Strain: Not on file   Food Insecurity: Not on file   Transportation Needs: Not on file   Physical Activity: Not on file   Stress: Not on file   Social Connections: Not on file   Intimate Partner Violence: Not on file   Housing Stability: Not on file       PhysicalExamination:  Vitals: There were no vitals taken for this visit  Physical Exam  General: , Awake alert and oriented x 3, conversant without conversational dyspnea, NAD, normal affect  HEENT:  PERRL, Sclera noninjected, nonicteric OU, Nares patent,  no craniofacial abnormalities  NECK: Trachea midline, no accessory muscle use  EXT: No cyanosis, no clubbing, no edema, normal capillary refill  NEURO: no focal neurologic deficits, AAOx3, moving all extremities appropriately    Diagnostic Data:  Labs: I personally reviewed the most recent laboratory data pertinent to today's visit  not applicable    I have personally reviewed pertinent lab results    Lab Results   Component Value Date    WBC 3 5 06/03/2022    HGB 13 4 06/03/2022    HCT 39 9 06/03/2022    MCV 91 06/03/2022     06/03/2022     Lab Results   Component Value Date    K 4 6 06/03/2022    CO2 25 06/03/2022     06/03/2022    BUN 21 (H) 06/03/2022    CREATININE 1 12 06/03/2022     No results found for: IGE  Lab Results   Component Value Date    ALT 20 06/03/2022    AST 20 06/03/2022     Lab Results   Component Value Date    IRON 76 06/03/2022    TIBC 279 06/03/2022    FERRITIN 187 06/03/2022     No results found for: [id number]  No results found for: FOLATE      Arterial Blood Gas result: NA    Sleep studies:  Diagnostic: HST showed GURU 7 7  Titration:  Split:    Compliance Data:  Type of CPAP:  ResMed AirSense 11 (12/24-3/23/23)                                   Percent usage: 74% more than 4 hours                                   Average time used: 6 hours and 54 minutes                                  Time in large leak: Median 1 1                                   Residual AHI: 1 6                                         Sherry Rucker MD  [facility] Sleep Medicine Fellow

## 2023-03-28 NOTE — TELEPHONE ENCOUNTER
03/28/23 3:34 PM     The office's request has been received, reviewed, and the patient chart updated  The PCP has successfully been removed with a patient attribution note  This message will now be completed      Thank you  Jeanna Mcclure

## 2023-05-02 ENCOUNTER — CONSULT (OUTPATIENT)
Dept: INFECTIOUS DISEASES | Facility: CLINIC | Age: 23
End: 2023-05-02

## 2023-05-02 VITALS
SYSTOLIC BLOOD PRESSURE: 128 MMHG | WEIGHT: 212 LBS | RESPIRATION RATE: 18 BRPM | BODY MASS INDEX: 27.21 KG/M2 | TEMPERATURE: 97.8 F | HEIGHT: 74 IN | OXYGEN SATURATION: 98 % | HEART RATE: 67 BPM | DIASTOLIC BLOOD PRESSURE: 74 MMHG

## 2023-05-02 DIAGNOSIS — Z71.84 TRAVEL ADVICE ENCOUNTER: Primary | ICD-10-CM

## 2023-05-02 RX ORDER — ATOVAQUONE AND PROGUANIL HYDROCHLORIDE 250; 100 MG/1; MG/1
1 TABLET, FILM COATED ORAL
Qty: 17 TABLET | Refills: 1 | Status: SHIPPED | OUTPATIENT
Start: 2023-05-02 | End: 2023-05-19

## 2023-05-02 NOTE — PROGRESS NOTES
Travel Clinic    Patient is traveling to countries or areas within countries where immunizations are required or recommended:   [place]    Patient states they will visit underdeveloped areas with poor sanitation Yes/No: Yes   Patient requires malaria prophylaxis Yes/No: Yes    No orders of the defined types were placed in this encounter        Patient instructed how to take medications Yes/No: Yes  Patient warned about side effects Yes/No: Yes  Patient declined Yes/No: No

## 2023-05-22 ENCOUNTER — HOSPITAL ENCOUNTER (OUTPATIENT)
Dept: RADIOLOGY | Facility: HOSPITAL | Age: 23
Discharge: HOME | End: 2023-05-22

## 2023-05-22 DIAGNOSIS — N50.811 PAIN IN RIGHT TESTICLE: ICD-10-CM

## 2023-05-25 ENCOUNTER — TELEPHONE (OUTPATIENT)
Dept: UROLOGY | Facility: AMBULATORY SURGERY CENTER | Age: 23
End: 2023-05-25

## 2023-05-25 NOTE — TELEPHONE ENCOUNTER
"----- Message from [id number] Willis Coffey sent at 5/25/2023 10:53 AM EDT -----  Patient last seen by Letitia Banegas in January 2023  Please inform patient results of scrotal ultrasound were unremarkable  Spoke with pt and advised of results as stated above  Pt denied any hx of pain, only reported \"lumps\" that would come and go x 2 years  I advised him according to this u/s, everything looked normal and unremarkable, advised him if symptoms worsen or any changes, to give our office a call  Pt in agreement with plan      "

## 2023-06-09 ENCOUNTER — OFFICE VISIT (OUTPATIENT)
Dept: FAMILY MEDICINE CLINIC | Facility: CLINIC | Age: 23
End: 2023-06-09
Payer: COMMERCIAL

## 2023-06-09 VITALS
TEMPERATURE: 98.1 F | BODY MASS INDEX: 27.11 KG/M2 | RESPIRATION RATE: 18 BRPM | SYSTOLIC BLOOD PRESSURE: 110 MMHG | DIASTOLIC BLOOD PRESSURE: 68 MMHG | WEIGHT: 211.2 LBS | HEART RATE: 52 BPM | OXYGEN SATURATION: 99 % | HEIGHT: 74 IN

## 2023-06-09 DIAGNOSIS — A08.4 VIRAL GASTROENTERITIS: Primary | ICD-10-CM

## 2023-06-09 PROBLEM — R07.9 CHEST PAIN: Status: RESOLVED | Noted: 2022-01-04 | Resolved: 2023-06-09

## 2023-06-09 PROBLEM — G47.19 EXCESSIVE DAYTIME SLEEPINESS: Status: RESOLVED | Noted: 2022-12-19 | Resolved: 2023-06-09

## 2023-06-09 PROBLEM — J45.20 MILD INTERMITTENT ASTHMA: Status: ACTIVE | Noted: 2023-06-09

## 2023-06-09 PROBLEM — Z71.3 DIETARY COUNSELING: Status: RESOLVED | Noted: 2022-05-18 | Resolved: 2023-06-09

## 2023-06-09 PROBLEM — Z71.82 EXERCISE COUNSELING: Status: RESOLVED | Noted: 2022-05-18 | Resolved: 2023-06-09

## 2023-06-09 PROBLEM — Z13.31 NEGATIVE DEPRESSION SCREENING: Status: RESOLVED | Noted: 2021-06-01 | Resolved: 2023-06-09

## 2023-06-09 PROCEDURE — 99213 OFFICE O/P EST LOW 20 MIN: CPT | Performed by: NURSE PRACTITIONER

## 2023-06-09 NOTE — PROGRESS NOTES
Assessment/Plan:     Symptoms overall improving, almost resolved  Will defer any stool studies or labs since he is clinically improving  Advised to continue probiotic, hydrate well, clean diet  F/u for any recurrent symptoms  1  Viral gastroenteritis            There are no Patient Instructions on file for this visit  Return if symptoms worsen or fail to improve  Subjective:      Patient ID: Cori Singh is a 25 y o  male  Chief Complaint   Patient presents with   • Diarrhea     Patient was in [facility], experienced vomiting, diarrhea, chills, dizziness and body aches x 2-3 days 1 week ago  L Kapoor/LPN       Pt was recently traveling in [facility] and developed a viral gastroenteritis  He was evaluated at the hospital there and started on Nexium and a probiotic  He initially had generalized abdominal pain, vomiting, diarrhea, chills and sweats  Symptoms have significantly improved  Last episode of diarrhea was 2 days ago and bowel movements are now more formed, still soft  He was prescribed malaria prophylaxis prior to travel    Diarrhea   Pertinent negatives include no abdominal pain, chills, fever or vomiting  The following portions of the patient's history were reviewed and updated as appropriate: allergies, current medications, past family history, past medical history, past social history, past surgical history and problem list     Review of Systems   Constitutional: Negative for chills and fever  Respiratory: Negative  Cardiovascular: Negative  Gastrointestinal: Negative for abdominal pain, blood in stool, constipation, diarrhea and vomiting  Neurological: Negative for dizziness and light-headedness           Current Outpatient Medications   Medication Sig Dispense Refill   • atovaquone-proguanil (MALARONE) 250-100 mg Take 1 tablet by mouth daily with breakfast for 17 days Begin 1 day before entering malaria area and continue daily while in malaria area and for 1 week after "leaving area 17 tablet 1     No current facility-administered medications for this visit  Objective:    /68   Pulse (!) 52   Temp 98 1 °F (36 7 °C) (Tympanic)   Resp 18   Ht 6' 2\" (1 88 m)   Wt 95 8 kg (211 lb 3 2 oz)   SpO2 99%   BMI 27 12 kg/m²        Physical Exam  Vitals and nursing note reviewed  Constitutional:       Appearance: Normal appearance  He is well-developed  Cardiovascular:      Rate and Rhythm: Normal rate and regular rhythm  Heart sounds: Normal heart sounds  No murmur heard  Pulmonary:      Effort: Pulmonary effort is normal       Breath sounds: Normal breath sounds  Abdominal:      General: Bowel sounds are normal  There is no distension  Palpations: Abdomen is soft  Tenderness: There is no abdominal tenderness  Skin:     General: Skin is warm and dry  Neurological:      Mental Status: He is alert     Psychiatric:         Mood and Affect: Mood normal          Behavior: Behavior normal                 JAYLENE Nolasco  "

## 2023-06-09 NOTE — PROGRESS NOTES
"Assessment/Plan:    1  Traveler's diarrhea            There are no Patient Instructions on file for this visit  No follow-ups on file  Subjective:      Patient ID: Laurance Bence is a 25 y o  male  Chief Complaint   Patient presents with   • Diarrhea     Patient was in [facility], experienced vomiting, diarrhea, chills, dizziness and body aches x 2-3 days 1 week ago  L Kapoor/LPN       Was traveling in [facility]  Developed vomiting, diarrhea, fatigue, and body aches  This was 1 week ago  Had some tingling in his hands and feet  Has continued with diarrhea  Bowel movements more formed now, but soft  No abdominal pain  Multiflor Nexium  Finance   stock exchange in [place]        Diarrhea         The following portions of the patient's history were reviewed and updated as appropriate: allergies, current medications, past family history, past medical history, past social history, past surgical history and problem list     Review of Systems   Gastrointestinal: Positive for diarrhea  Current Outpatient Medications   Medication Sig Dispense Refill   • atovaquone-proguanil (MALARONE) 250-100 mg Take 1 tablet by mouth daily with breakfast for 17 days Begin 1 day before entering malaria area and continue daily while in malaria area and for 1 week after leaving area 17 tablet 1     No current facility-administered medications for this visit         Objective:    /68   Pulse (!) 52   Temp 98 1 °F (36 7 °C) (Tympanic)   Resp 18   Ht 6' 2\" (1 88 m)   Wt 95 8 kg (211 lb 3 2 oz)   SpO2 99%   BMI 27 12 kg/m²        Physical Exam           JAYLENE Waters  "

## 2023-07-20 ENCOUNTER — OFFICE VISIT (OUTPATIENT)
Dept: SLEEP CENTER | Facility: CLINIC | Age: 23
End: 2023-07-20
Payer: COMMERCIAL

## 2023-07-20 VITALS
HEIGHT: 74 IN | WEIGHT: 223 LBS | HEART RATE: 65 BPM | DIASTOLIC BLOOD PRESSURE: 64 MMHG | BODY MASS INDEX: 28.62 KG/M2 | SYSTOLIC BLOOD PRESSURE: 132 MMHG

## 2023-07-20 DIAGNOSIS — G47.33 OSA (OBSTRUCTIVE SLEEP APNEA): Primary | ICD-10-CM

## 2023-07-20 PROCEDURE — 99213 OFFICE O/P EST LOW 20 MIN: CPT | Performed by: PSYCHIATRY & NEUROLOGY

## 2023-07-20 NOTE — PATIENT INSTRUCTIONS
Nursing Support:  When: Monday through Friday 7A-5PM except holidays  Where: Our direct line is [phone]. If you are having a true emergency please call 911. In the event that the line is busy or it is after hours please leave a voice message and we will return your call. Please speak clearly, leaving your full name, birth date, best number to reach you and the reason for your call. HOW TO CLEAN YOUR AUTO CPAP MACHINE    Mask: Clean the cushion of your mask daily. Dish soap and warm water. (Usually eligible for mask cushions every 3 months)    Headgear: Once a week. I find when you wash it daily it eats the material of the Velcro faster.  (Usually eligible for new headgear every 6 months)    Heated Tubing: Clean the tube every 3-7 days. One drop of dish soap run warm water through the tube then hang it over your shower curtain and let it drip dry. (Usually eligible every 3 months)    Humidifier: Rinse out every 1-3 days. Dish soap warm water or , top shelf. (eligible every 6 months) **DISTILLED WATER ONLY**    Filters:  Dark blue (reusable for 6 months)- Rinse under warm water (no soap) sit and drip dry every 2-3 weeks. (eligible for a new one every 6 months)    Light Blue (disposable) throw away every 2-4 weeks depending on how dirty it looks. (eligible for 6 every 3 months)    Check with your insurance and durable medical equipment company regarding supply replacements. It is very important to avoid driving while drowsy, this can be very dangerous or even cause serious injury or death. If sleepy, it is not safe to get behind the wheel. If you are driving and feels sleepy, it is very important to pull over right away. Even losing control of the car for a split second can be deadly. If you feel you cannot control when sleepiness occurs and cannot prevented, it is important to not drive at all until this improves. Please let me know if you experience this as it is very important.

## 2023-07-20 NOTE — PROGRESS NOTES
Progress Note - Sleep Medicine  Shelbie Strickland 25 y.o. male MRN: [id number]    Impression & Plan:   Gilford Herb is a 25year-old male who presents today for follow-up of mild JULIO CÉSAR and EDS treated with auto PAP (6-9 cm H2O). Last seen on 3/27/2023. Home sleep study on 10/26/2022 showed a GURU 7.7. Compliant with auto PAP as below. Residual AHI of 0.3. Benefit of decreased EDS and brain fog. Initial [place] score was 17, improved to 3. Will have nasal congestion about half of the nights around bedtime (which can make it difficult to fall asleep while using CPAP). History of seasonal allergies. Recommended OTC fluticasone. Will continue auto PAP (6-9 cm H2O) and ordered DME PAP supplies. Directed to contact [employer] about how to obtain future supplies from an office in [place]. Mild JULIO CÉSAR with EDS  -Home sleep study on 10/26/2022 showed a GURU 7.7  -Compliant with auto PAP as below  -Residual AHI of 0.3  -Benefit of decreased EDS and brain fog. Initial [place] score was 17, improved to 3  -Will continue auto PAP (6-9 cm H2O) and ordered DME PAP supplies  -Directed to contact [employer] about how to obtain supplies from an office in [place]  -Will have nasal congestion about half of the nights around bedtime (which can make it difficulty to fall asleep while using CPAP). Recommended OTC fluticasone    Staffed and seen with Dr. Bridget Apgar on 2023  ______________________________________________________________________    HPI:    Gilford Herb is a 25year-old male who presents today for follow-up of mild JULIO CÉSAR and EDS treated with auto PAP (6-9 cm H2O). Last seen on 3/27/2023. Weight gain (intentional muscle gain), denies any impact on sleep.  Denies any significant change in PMH, PSH, medications, or allergies since his last visit. Denies SOB, wheezing, chest pain, palpitations, or peripheral edema. On evaluation, patient is sitting in chair and in no acute distress. He is moving to [facility] in 9/2023 to start a new job. Consistent sleep schedule 11:30 PM to 7:30 AM. Sleep latency of 10-15 minutes. Awakenings ~1-2 times per night due to nocturia. Drinks water near to bedtime. About half of the nights, he will have nasal congestion at bedtime which can make it difficult for him to sleep. History of seasonal allergies. Denies excessive nocturnal leg movements. Denies history of sleep paralysis. Remote history of sleep-related hallucinations (~1-2 times in lifetime).       Patient reported issues with the following symptoms:   Mask type: full face mask  Mask leaking: denies  Skin irritation from the mask: denies  Pain or discomfort: denies  Feeling of claustrophobia: denies  Aerophagia: infrequently  Pressure intolerance: denies  Nasal congestion: more noticeable than prior in life, ~every other night at bedtime, and can cause difficulty falling to sleep with using CPAP   Rhinorrhea: denies  Nasal and oral dryness: sometimes  Snoring with PAP: denies  Apnea with PAP: denies    Using PAP every night/day: yes  Using PAP the entire duration of sleep: yes  Benefiting from PAP: decreased EDS and brain fog    Driving while drowsy: denies    [place]: 3, down from 17 on initial presentation in 12/2022    Social history:  Lives with parents  Graduated from [employer]  Will start a Job in [place] in 9/2023  Caffeine: denies   Alcohol: denies  Tobacco: denies  Illicit drug use: denies    Family history:  Father and paternal uncles - snoring, no formal diagnosis of JULIO CÉSAR    Social history updates:  Social History     Tobacco Use   Smoking Status Never   Smokeless Tobacco Never     Social History     Socioeconomic History   • Marital status: Single     Spouse name: Not on file   • Number of children: Not on file   • Years of education: Not on file   • Highest education level: Not on file   Occupational History   • Not on file   Tobacco Use   • Smoking status: Never   • Smokeless tobacco: Never   Vaping Use   • Vaping Use: Never used   Substance and Sexual Activity   • Alcohol use: Yes     Comment: 2 times a month   • Drug use: Never   • Sexual activity: Not Currently   Other Topics Concern   • Not on file   Social History Narrative    4th year of College 2022    Goes to the gym 5 days a week    No pets     Social Determinants of Health     Financial Resource Strain: Not on file   Food Insecurity: Not on file   Transportation Needs: Not on file   Physical Activity: Sufficiently Active (6/1/2021)    Exercise Vital Sign    • Days of Exercise per Week: 5 days    • Minutes of Exercise per Session: 90 min   Stress: Not on file   Social Connections: Not on file   Intimate Partner Violence: Not on file   Housing Stability: Not on file       PhysicalExamination:  Vitals:   /64 (BP Location: Left arm, Patient Position: Sitting, Cuff Size: Adult)   Pulse 65   Ht 6' 2" (1.88 m)   Wt 101 kg (223 lb)   BMI 28.63 kg/m²     Physical Exam:  General: Sitting in chair, awake alert and oriented to person, place, and time. No acute distress  HEENT: Nares patent, no craniofacial abnormalities. Mucous membranes, moist, no oral lesions, and normal dentition. Mallampati class - III. Mild macroglossia  NECK: Neck circumference 15 inches. Trachea midline, no accessory muscle use, and no stridor   CARDIAC: Regular rate and rhythm  PULM: CTA bilaterally no wheezing, rhonchi or rales. No conversational dyspnea  EXT: No cyanosis, no clubbing, and no peripheral edema    NEURO: No focal neurologic deficits, moving all extremities appropriately    Diagnostic Data:  Labs: I personally reviewed the most recent laboratory data pertinent to today's visit  No visits with results within 6 Month(s) from this visit.    Latest known visit with results is:   Office Visit on 01/20/2023   Component Date Value   • Specific Gravity 01/20/2023      >=1.030 (A)    • Ph 01/20/2023 6.0    • Color UA 01/20/2023 Yellow    • Urine Appearance 01/20/2023 Clear    • Leukocyte Esterase 01/20/2023 Negative    • Protein 01/20/2023 Negative    • Glucose, 24 HR Urine 01/20/2023 Negative    • Ketone, Urine 01/20/2023 Negative    • Blood, Urine 01/20/2023 Negative    • Bilirubin, Urine 01/20/2023 Negative    • Urobilinogen Urine 01/20/2023 0.2    • SL AMB NITRITES URINE, Q* 01/20/2023 Negative    • Microscopic Examination 01/20/2023 Comment    • Microscopic Examination 01/20/2023 See below:    • Urine Culture Result 01/20/2023 Final report    • SL AMB WBC, URINE 01/20/2023 None seen    • RBC, Urine 01/20/2023 None seen    • Epithelial Cells (non re* 01/20/2023 None seen    • Casts 01/20/2023 None seen    • Bacteria, Urine 01/20/2023 None seen    • Result 1 01/20/2023 No growth        I have personally reviewed pertinent lab results.   Lab Results   Component Value Date    WBC 3.5 06/03/2022    HGB 13.4 06/03/2022    HCT 39.9 06/03/2022    MCV 91 06/03/2022     06/03/2022     Lab Results   Component Value Date    K 4.6 06/03/2022    CO2 25 06/03/2022     06/03/2022    BUN 21 (H) 06/03/2022    CREATININE 1.12 06/03/2022     No results found for: "IGE"  Lab Results   Component Value Date    ALT 20 06/03/2022    AST 20 06/03/2022     Lab Results   Component Value Date    IRON 76 06/03/2022    TIBC 279 06/03/2022    FERRITIN 187 06/03/2022     No results found for: "[place] [employer]"  No results found for: "FOLATE"    Sleep studies:  Home sleep study on 10/26/2022 showed a GURU 7.7    Compliance Data:  Type of CPAP: AirSense 11 AutoSet (6-9 cm H2O)                                   Percent usage: 87%, >4 hours                                   Average time used: 6 hours, 41 minutes                                   Median pressure: 6.1 cm H2O                                   Time in large leak: 0 Residual AHI: 0.3                                     Lamine Kelsey DO  [facility] Sleep Medicine Fellow